# Patient Record
Sex: FEMALE | Race: BLACK OR AFRICAN AMERICAN | NOT HISPANIC OR LATINO | URBAN - METROPOLITAN AREA
[De-identification: names, ages, dates, MRNs, and addresses within clinical notes are randomized per-mention and may not be internally consistent; named-entity substitution may affect disease eponyms.]

---

## 2018-11-12 ENCOUNTER — OFFICE VISIT (OUTPATIENT)
Dept: FAMILY MEDICINE | Facility: CLINIC | Age: 33
End: 2018-11-12
Payer: COMMERCIAL

## 2018-11-12 VITALS
HEIGHT: 66 IN | TEMPERATURE: 98.4 F | WEIGHT: 123 LBS | OXYGEN SATURATION: 98 % | HEART RATE: 67 BPM | BODY MASS INDEX: 19.77 KG/M2

## 2018-11-12 DIAGNOSIS — A08.4 VIRAL ENTERITIS: Primary | ICD-10-CM

## 2018-11-12 PROCEDURE — 99213 OFFICE O/P EST LOW 20 MIN: CPT | Performed by: FAMILY MEDICINE

## 2018-11-12 RX ORDER — DROSPIRENONE AND ETHINYL ESTRADIOL 0.03MG-3MG
1 KIT ORAL
Refills: 1 | COMMUNITY
Start: 2018-10-22

## 2018-11-12 ASSESSMENT — ENCOUNTER SYMPTOMS
FATIGUE: 1
VOMITING: 0
SINUS PAIN: 0
CONSTIPATION: 0
SORE THROAT: 0
WHEEZING: 0
DIARRHEA: 1
CHILLS: 0
NAUSEA: 0
RHINORRHEA: 0
SHORTNESS OF BREATH: 0
ABDOMINAL PAIN: 0
SINUS PRESSURE: 0
PALPITATIONS: 0
FEVER: 0

## 2018-11-12 NOTE — LETTER
November 12, 2018     Patient: Robert Palacio   YOB: 1985   Date of Visit: 11/12/2018       To Whom it May Concern:    Robert Palacio was seen in my clinic on 11/12/2018 at 11:00 am. Please excuse Robert for her absence from work on this day to make the appointment.    If you have any questions or concerns, please don't hesitate to call.         Sincerely,         Amanda Booker, DO        CC: No Recipients

## 2018-11-12 NOTE — PATIENT INSTRUCTIONS
Over the counter debrox for your left ear to help soften the wax    If bowel movements become more frequent >5-6 a day, can take over the counter imodium

## 2018-11-12 NOTE — PROGRESS NOTES
"Subjective      Patient ID: Kyrsjeanne Palacio is a 33 y.o. female.    HPI     Cold sx x 6 days  Feels achy  Left ear ache  +diarrhea - going 3 times a day - just loose. No cramping no pain. Hasn't eaten as much lately either  Didn't work last week because isn't able to get to the bathroom at work unless the kids are on a break so didn't want to run the risk of not being able to go when needed   Feels warm but denies actual fever/chills  No sore throat no runny nose  No sinus pain or pressure    Got flu shot already this year      The following have been reviewed and updated as appropriate in this visit:  Allergies  Meds  Problems       Review of Systems   Constitutional: Positive for fatigue. Negative for chills and fever.   HENT: Negative for congestion, rhinorrhea, sinus pain, sinus pressure, sore throat and tinnitus.         Ear feels muffled (left)   Respiratory: Negative for shortness of breath and wheezing.    Cardiovascular: Negative for chest pain and palpitations.   Gastrointestinal: Positive for diarrhea. Negative for abdominal pain, constipation, nausea and vomiting.   Skin: Negative for rash.       Social History     Social History   • Marital status: Single     Spouse name: N/A   • Number of children: N/A   • Years of education: N/A     Occupational History   • Not on file.     Social History Main Topics   • Smoking status: Not on file   • Smokeless tobacco: Not on file   • Alcohol use Not on file   • Drug use: Unknown   • Sexual activity: Not on file     Other Topics Concern   • Not on file     Social History Narrative   • No narrative on file       Objective     Vitals:    11/12/18 1112   Pulse: 67   Temp: 36.9 °C (98.4 °F)   TempSrc: Oral   SpO2: 98%   Weight: 55.8 kg (123 lb)   Height: 1.67 m (5' 5.75\")     Body mass index is 20.01 kg/m².    Physical Exam   Constitutional: She is oriented to person, place, and time. She appears well-developed and well-nourished.   HENT:   Head: Normocephalic and " atraumatic.   Right Ear: External ear normal.   Nose: Nose normal.   Mouth/Throat: Oropharynx is clear and moist. No oropharyngeal exudate.   Left ear with cerumen impaction   Cardiovascular: Normal rate, regular rhythm and normal heart sounds.  Exam reveals no gallop and no friction rub.    No murmur heard.  Pulmonary/Chest: Effort normal and breath sounds normal. No respiratory distress. She has no wheezes. She has no rales.   Abdominal: Soft. Bowel sounds are normal. She exhibits no distension and no mass. There is no tenderness. There is no rebound and no guarding.   Neurological: She is alert and oriented to person, place, and time.   Skin: Skin is warm and dry.   Psychiatric: She has a normal mood and affect. Her behavior is normal.       Assessment/Plan   Problem List Items Addressed This Visit     None      Visit Diagnoses     Viral enteritis    -  Primary    improving, increase fluids, otc imodium if sx worsen. okay to return for work

## 2019-06-18 ENCOUNTER — OFFICE VISIT (OUTPATIENT)
Dept: FAMILY MEDICINE | Facility: CLINIC | Age: 34
End: 2019-06-18
Payer: COMMERCIAL

## 2019-06-18 VITALS
BODY MASS INDEX: 21.99 KG/M2 | WEIGHT: 132 LBS | TEMPERATURE: 98.8 F | DIASTOLIC BLOOD PRESSURE: 62 MMHG | HEIGHT: 65 IN | OXYGEN SATURATION: 96 % | HEART RATE: 68 BPM | SYSTOLIC BLOOD PRESSURE: 104 MMHG

## 2019-06-18 DIAGNOSIS — Z00.00 ROUTINE MEDICAL EXAM: Primary | ICD-10-CM

## 2019-06-18 DIAGNOSIS — Z11.1 TUBERCULOSIS SCREENING: ICD-10-CM

## 2019-06-18 DIAGNOSIS — M65.4 TENOSYNOVITIS, DE QUERVAIN: ICD-10-CM

## 2019-06-18 PROCEDURE — 86580 TB INTRADERMAL TEST: CPT | Performed by: FAMILY MEDICINE

## 2019-06-18 PROCEDURE — 99395 PREV VISIT EST AGE 18-39: CPT | Mod: 25 | Performed by: FAMILY MEDICINE

## 2019-06-18 ASSESSMENT — ENCOUNTER SYMPTOMS
TROUBLE SWALLOWING: 0
ADENOPATHY: 0
ARTHRALGIAS: 0
NERVOUS/ANXIOUS: 0
NAUSEA: 0
COLOR CHANGE: 0
CHILLS: 0
CHEST TIGHTNESS: 0
JOINT SWELLING: 0
VOMITING: 0
SHORTNESS OF BREATH: 0
HEADACHES: 0
COUGH: 0
BLOOD IN STOOL: 0
SORE THROAT: 0
DIFFICULTY URINATING: 0
CONSTIPATION: 0
PALPITATIONS: 0
ABDOMINAL PAIN: 0
HEMATURIA: 0
WHEEZING: 0
DIARRHEA: 0
DIZZINESS: 0
UNEXPECTED WEIGHT CHANGE: 0
EYE REDNESS: 0
FEVER: 0

## 2019-06-18 NOTE — PROGRESS NOTES
[facility]     CHIEF COMPLAINT   Robert Palacio is a 34 y.o. female who presents today for routine annual physical.   HISTORY OF PRESENT ILLNESS      Right thumb discomfort for the past few weeks. Denies injury. Aggravated with adduction.        Labs  No results found for: CREATININE  No results found for: WBC, HGB, HCT, MCV, PLT      No results found for: HGBA1C  No results found for: MICROALBUR, [id number]    PAST MEDICAL AND SURGICAL HISTORY        History reviewed. No pertinent past medical history.    Past Surgical History:   Procedure Laterality Date   • NO PAST SURGERIES         MEDICATIONS        Current Outpatient Prescriptions:   •  drospirenone-ethinyl estradiol (MAUDE,OCELLA) 3-0.03 mg per tablet, Take 1 tablet by mouth once daily., Disp: , Rfl: 1  •  FLUCELVAX QUAD 7826-6031, PF, 60 mcg (15 mcg x 4)/0.5 mL syringe injection, TO BE ADMINISTERED BY PHARMACIST FOR IMMUNIZATION, Disp: , Rfl: 0    ALLERGIES      Penicillins    FAMILY HISTORY      Family History   Problem Relation Age of Onset   • Adrenal disorder Father    • Diabetes Father    • Hypertension Father    • Diabetes Mother    • Hypertension Mother        SOCIAL HISTORY      Social History     Social History   • Marital status: Single     Spouse name: N/A   • Number of children: N/A   • Years of education: N/A     Occupational History   • Teacher       Social History Main Topics   • Smoking status: Never Smoker   • Smokeless tobacco: Never Used   • Alcohol use Yes      Comment: social    • Drug use: No   • Sexual activity: Yes     Partners: Male     Birth control/ protection: None     Other Topics Concern   • None     Social History Narrative   • None       REVIEW OF SYSTEMS      .Review of Systems   Constitutional: Negative for chills, fever and unexpected weight change.   HENT: Negative for congestion, ear pain, sore throat and trouble swallowing.    Eyes: Negative for redness and visual disturbance.   Respiratory:  "Negative for cough, chest tightness, shortness of breath and wheezing.    Cardiovascular: Negative for chest pain, palpitations and leg swelling.   Gastrointestinal: Negative for abdominal pain, blood in stool, constipation, diarrhea, nausea and vomiting.   Genitourinary: Negative for difficulty urinating and hematuria.   Musculoskeletal: Negative for arthralgias and joint swelling.   Skin: Negative for color change and rash.   Neurological: Negative for dizziness and headaches.   Hematological: Negative for adenopathy.   Psychiatric/Behavioral: The patient is not nervous/anxious.        PHYSICAL EXAMINATION      Vitals:    06/18/19 1233   BP: 104/62   Pulse: 68   Temp: 37.1 °C (98.8 °F)   SpO2: 96%   Weight: 59.9 kg (132 lb)   Height: 1.651 m (5' 5\")     Body mass index is 21.97 kg/m².    Physical Exam   Constitutional: She is oriented to person, place, and time. She appears well-developed and well-nourished.   HENT:   Head: Normocephalic.   Right Ear: Tympanic membrane, external ear and ear canal normal.   Left Ear: Tympanic membrane, external ear and ear canal normal.   Nose: Nose normal.   Mouth/Throat: Oropharynx is clear and moist. No oropharyngeal exudate.   Eyes: Pupils are equal, round, and reactive to light. Conjunctivae are normal.   Neck: Normal range of motion. Neck supple. No thyromegaly present.   Cardiovascular: Normal rate, regular rhythm and normal heart sounds.    Pulmonary/Chest: Effort normal and breath sounds normal.   Abdominal: Soft. She exhibits no mass. There is no tenderness.   Musculoskeletal: Normal range of motion. She exhibits no edema, tenderness or deformity.   DeQuervain's tendosynovitis: positive Finkelstein's test on R   Lymphadenopathy:     She has no cervical adenopathy.   Neurological: She is alert and oriented to person, place, and time.   Skin: Skin is warm and dry. No rash noted.   Psychiatric: She has a normal mood and affect. Her behavior is normal.          ASSESSMENT AND " PLAN   Assessment/Plan   Problem List Items Addressed This Visit     None      Visit Diagnoses     Routine medical exam    -  Primary    Relevant Orders    CBC and Differential    Comprehensive metabolic panel    Lipid panel    Tuberculosis screening        Relevant Orders    TB Skin Test    Tenosynovitis, de Quervain        ice, rest, thumb spica splint; RTO prn           Health maintenance discussed included:    Importance of engaging in regular physical activity. Gradually increasing activity to achieve the universal goal of 150 minutes of aerobic activity per week and 2 sessions of resistance exercise weekly would be ideal.     Importance of consuming a healthy diet was emphasized. Adoption of high quality, low fat, low carb foods was emphasized as was the importance of portion control.     Substance Abuse: refraining from tobacco usage, not exceeding moderate ETOH recommendations (2 drinks daily for men and 1 drink for women with 1 drink equal to 12 oz 4% beer, 6 oz wine or 1.5 oz spirits) and avoid the use of illicit drugs.    Importance of completing recommended health maintenance measures listed in the After Visit Summary.    Completing regular dental examinations and brushing and flossing daily.    Enhance safety by wearing seat belts, checking smoke and CO detectors and monitoring living space for potential fall risks.     Practicing safe sex where applicable.      Antony Rocha,   06/18/19  1:08 PM

## 2019-06-18 NOTE — PATIENT INSTRUCTIONS
Patient Education   De Quervain Tenosynovitis  Tendons attach muscles to bones. They also help with joint movements. When tendons become irritated or swollen, it is called tendinitis.  The extensor pollicis brevis (EPB) tendon connects the EPB muscle to a bone that is near the base of the thumb. The EPB muscle helps to straighten and extend the thumb. De Quervain tenosynovitis is a condition in which the EPB tendon lining (sheath) becomes irritated, thickened, and swollen. This condition is sometimes called stenosing tenosynovitis. This condition causes pain on the thumb side of the back of the wrist.  What are the causes?  Causes of this condition include:  · Activities that repeatedly cause your thumb and wrist to extend.  · A sudden increase in activity or change in activity that affects your wrist.  What increases the risk?  This condition is more likely to develop in:  · Females.  · People who have diabetes.  · Women who have recently given birth.  · People who are over 40 years of age.  · People who do activities that involve repeated hand and wrist motions, such as tennis, racquetball, volleyball, gardening, and taking care of children.  · People who do heavy labor.  · People who have poor wrist strength and flexibility.  · People who do not warm up properly before activities.  What are the signs or symptoms?  Symptoms of this condition include:  · Pain or tenderness over the thumb side of the back of the wrist when your thumb and wrist are not moving.  · Pain that gets worse when you straighten your thumb or extend your thumb or wrist.  · Pain when the injured area is touched.  · Locking or catching of the thumb joint while you bend and straighten your thumb.  · Decreased thumb motion due to pain.  · Swelling over the affected area.  How is this diagnosed?  This condition is diagnosed with a medical history and physical exam. Your health care provider will ask for details about your injury and ask about your  symptoms.  How is this treated?  Treatment may include the use of icing and medicines to reduce pain and swelling. You may also be advised to wear a splint or brace to limit your thumb and wrist motion. In less severe cases, treatment may also include working with a physical therapist to strengthen your wrist and calm the irritation around your EPB tendon sheath. In severe cases, surgery may be needed.  Follow these instructions at home:  If you have a splint or brace:  · Wear it as told by your health care provider. Remove it only as told by your health care provider.  · Loosen the splint or brace if your fingers become numb and tingle, or if they turn cold and blue.  · Keep the splint or brace clean and dry.  Managing pain, stiffness, and swelling  · If directed, apply ice to the injured area.  ¨ Put ice in a plastic bag.  ¨ Place a towel between your skin and the bag.  ¨ Leave the ice on for 20 minutes, 2-3 times per day.  · Move your fingers often to avoid stiffness and to lessen swelling.  · Raise (elevate) the injured area above the level of your heart while you are sitting or lying down.  General instructions  · Return to your normal activities as told by your health care provider. Ask your health care provider what activities are safe for you.  · Take over-the-counter and prescription medicines only as told by your health care provider.  · Keep all follow-up visits as told by your health care provider. This is important.  · Do not drive or operate heavy machinery while taking prescription pain medicine.  Contact a health care provider if:  · Your pain, tenderness, or swelling gets worse, even if you have had treatment.  · You have numbness or tingling in your wrist, hand, or fingers on the injured side.  This information is not intended to replace advice given to you by your health care provider. Make sure you discuss any questions you have with your health care provider.  Document Released: 12/18/2006  Document Revised: 05/25/2017 Document Reviewed: 02/23/2016  [employer] Interactive Patient Education © 2018 [employer]       Thumb spica splint

## 2019-07-02 ENCOUNTER — TELEPHONE (OUTPATIENT)
Dept: FAMILY MEDICINE | Facility: CLINIC | Age: 34
End: 2019-07-02

## 2019-07-02 NOTE — TELEPHONE ENCOUNTER
Attempted to contact patient in regards to PE form. Patient stated she did not need to return to office for her PPD reading. Returned patients call for clarification

## 2019-07-24 ENCOUNTER — OFFICE VISIT (OUTPATIENT)
Dept: FAMILY MEDICINE | Facility: CLINIC | Age: 34
End: 2019-07-24
Payer: COMMERCIAL

## 2019-07-24 DIAGNOSIS — Z11.1 ENCOUNTER FOR PPD TEST: Primary | ICD-10-CM

## 2019-07-24 PROCEDURE — 86580 TB INTRADERMAL TEST: CPT | Performed by: FAMILY MEDICINE

## 2019-07-24 PROCEDURE — 99999 PR OFFICE/OUTPT VISIT,PROCEDURE ONLY: CPT | Performed by: FAMILY MEDICINE

## 2019-07-26 LAB
INDURATION: 0 MM
TB SKIN TEST: NEGATIVE

## 2019-11-12 ENCOUNTER — OFFICE VISIT (OUTPATIENT)
Dept: FAMILY MEDICINE | Facility: CLINIC | Age: 34
End: 2019-11-12
Payer: COMMERCIAL

## 2019-11-12 ENCOUNTER — DOCUMENTATION (OUTPATIENT)
Dept: FAMILY MEDICINE | Facility: CLINIC | Age: 34
End: 2019-11-12

## 2019-11-12 VITALS
SYSTOLIC BLOOD PRESSURE: 104 MMHG | HEIGHT: 65 IN | DIASTOLIC BLOOD PRESSURE: 62 MMHG | WEIGHT: 137 LBS | OXYGEN SATURATION: 95 % | HEART RATE: 84 BPM | BODY MASS INDEX: 22.82 KG/M2 | TEMPERATURE: 98.3 F

## 2019-11-12 DIAGNOSIS — K52.9 GASTROENTERITIS: Primary | ICD-10-CM

## 2019-11-12 PROCEDURE — 99214 OFFICE O/P EST MOD 30 MIN: CPT | Performed by: FAMILY MEDICINE

## 2019-11-12 RX ORDER — LOPERAMIDE HYDROCHLORIDE 2 MG/1
2 CAPSULE ORAL 4 TIMES DAILY PRN
Qty: 30 CAPSULE | Refills: 0 | Status: SHIPPED | OUTPATIENT
Start: 2019-11-12 | End: 2022-02-28 | Stop reason: ALTCHOICE

## 2019-11-12 ASSESSMENT — ENCOUNTER SYMPTOMS
BLOOD IN STOOL: 0
EYE REDNESS: 0
ABDOMINAL PAIN: 0
NERVOUS/ANXIOUS: 0
FEVER: 0
DIARRHEA: 1
JOINT SWELLING: 0
COLOR CHANGE: 0
COUGH: 0
WHEEZING: 0
SORE THROAT: 0
HEMATURIA: 0
CONSTIPATION: 0
SHORTNESS OF BREATH: 0
TROUBLE SWALLOWING: 0
APPETITE CHANGE: 1
DIFFICULTY URINATING: 0
DIZZINESS: 0
CHILLS: 0
ARTHRALGIAS: 0
CHEST TIGHTNESS: 0
HEADACHES: 0
PALPITATIONS: 0
NAUSEA: 0
UNEXPECTED WEIGHT CHANGE: 0
ADENOPATHY: 0
VOMITING: 0

## 2019-11-12 NOTE — LETTER
November 12, 2019     Patient: Robert Palacio  YOB: 1985  Date of Visit: 11/12/2019    To Whom it May Concern:    Robert Palacio was seen in my clinic on 11/12/2019 at 2:15 pm. Please excuse Robert for her absence from work today to be seen in the office. Anticipated return to full duty on 11/13/19.    If you have any questions or concerns, please don't hesitate to call.         Sincerely,         Antony Rocha,         CC: No Recipients

## 2019-11-12 NOTE — PROGRESS NOTES
[facility]     HISTORY OF PRESENT ILLNESS        Patient presents to the office for evaluation of diarrhea that has been present for the past 6 days.  Patient states that she is moving her bowels loosely approximately 5-6 times per day.  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  Her appetite has been decreased but she is tolerating liquids.  Patient denies any nausea or vomiting, abdominal pain, rectal bleeding, fever or chills.  She denies any ingestion of raw foods and has no travel history recently.      PAST MEDICAL AND SURGICAL HISTORY        No past medical history on file.    Past Surgical History:   Procedure Laterality Date   • NO PAST SURGERIES         MEDICATIONS        Current Outpatient Medications:   •  drospirenone-ethinyl estradiol (MAUDE,OCELLA) 3-0.03 mg per tablet, Take 1 tablet by mouth once daily., Disp: , Rfl: 1  •  FLUCELVAX QUAD [id number], PF, 60 mcg (15 mcg x 4)/0.5 mL syringe injection, TO BE ADMINISTERED BY PHARMACIST FOR IMMUNIZATION, Disp: , Rfl: 0  •  loperamide (IMODIUM) 2 mg capsule, Take 1 capsule (2 mg total) by mouth 4 (four) times a day as needed for diarrhea for up to 10 days., Disp: 30 capsule, Rfl: 0    ALLERGIES      Penicillins    FAMILY HISTORY      Family History   Problem Relation Age of Onset   • Adrenal disorder Biological Father    • Diabetes Biological Father    • Hypertension Biological Father    • Diabetes Biological Mother    • Hypertension Biological Mother        SOCIAL HISTORY      Social History     Socioeconomic History   • Marital status: Single     Spouse name: Not on file   • Number of children: Not on file   • Years of education: Not on file   • Highest education level: Not on file   Occupational History   • Occupation: Teacher    Social Needs   • Financial resource strain: Not on file   • Food insecurity:     Worry: Not on file     Inability: Not on file   • Transportation needs:     Medical: Not on  file     Non-medical: Not on file   Tobacco Use   • Smoking status: Never Smoker   • Smokeless tobacco: Never Used   Substance and Sexual Activity   • Alcohol use: Yes     Comment: social    • Drug use: No   • Sexual activity: Yes     Partners: Male     Birth control/protection: None   Lifestyle   • Physical activity:     Days per week: Not on file     Minutes per session: Not on file   • Stress: Not on file   Relationships   • Social connections:     Talks on phone: Not on file     Gets together: Not on file     Attends [employer] service: Not on file     Active member of club or organization: Not on file     Attends meetings of clubs or organizations: Not on file     Relationship status: Not on file   • Intimate partner violence:     Fear of current or ex partner: Not on file     Emotionally abused: Not on file     Physically abused: Not on file     Forced sexual activity: Not on file   Other Topics Concern   • Not on file   Social History Narrative   • Not on file       REVIEW OF SYSTEMS      .Review of Systems   Constitutional: Positive for appetite change. Negative for chills, fever and unexpected weight change.   HENT: Negative for congestion, ear pain, sore throat and trouble swallowing.    Eyes: Negative for redness and visual disturbance.   Respiratory: Negative for cough, chest tightness, shortness of breath and wheezing.    Cardiovascular: Negative for chest pain, palpitations and leg swelling.   Gastrointestinal: Positive for diarrhea. Negative for abdominal pain, blood in stool, constipation, nausea and vomiting.   Genitourinary: Negative for difficulty urinating and hematuria.   Musculoskeletal: Negative for arthralgias and joint swelling.   Skin: Negative for color change and rash.   Neurological: Negative for dizziness and headaches.   Hematological: Negative for adenopathy.   Psychiatric/Behavioral: The patient is not nervous/anxious.        PHYSICAL EXAMINATION      Vitals:    11/12/19 1427   BP:  "104/62   Pulse: 84   Temp: 36.8 °C (98.3 °F)   SpO2: 95%   Weight: 62.1 kg (137 lb)   Height: 1.651 m (5' 5\")     Body mass index is 22.8 kg/m².    Physical Exam   Constitutional: She is oriented to person, place, and time. She appears well-developed and well-nourished.   Cardiovascular: Normal rate, regular rhythm, normal heart sounds and intact distal pulses.   Pulmonary/Chest: Effort normal and breath sounds normal.   Abdominal: Soft. Bowel sounds are normal. She exhibits no distension and no mass. There is no tenderness. There is no rebound and no guarding.   Neurological: She is alert and oriented to person, place, and time.   Psychiatric: She has a normal mood and affect. Her behavior is normal.          ASSESSMENT AND PLAN   Assessment/Plan   Problem List Items Addressed This Visit     None      Visit Diagnoses     Gastroenteritis    -  Primary    Likely viral etiology; recommend brat diet and clear liquids; Rx for Imodium as needed.            Antony Rocha, DO  11/12/19  3:43 PM        "

## 2019-11-12 NOTE — LETTER
November 12, 2019    Patient: Robert Palacio  YOB: 1985  Date of Visit: 11/12/2019    To Whom it May Concern:    Robert Palacio was seen in my clinic on 11/12/2019 at 2:15 pm. Please excuse Robert for her absence from work today to be seen in office. Anticipated return to full duty on 11/14/2019.    If you have any questions or concerns, please don't hesitate to call.         Sincerely,         Antony Rocha, DO

## 2019-11-13 ENCOUNTER — TELEPHONE (OUTPATIENT)
Dept: FAMILY MEDICINE | Facility: CLINIC | Age: 34
End: 2019-11-13

## 2019-11-13 NOTE — TELEPHONE ENCOUNTER
Attempted to call patient and let her know she can  the requested work note. Patient voice mailbox full unable to leave message

## 2019-11-13 NOTE — TELEPHONE ENCOUNTER
Patient called requesting her out of work note be extended until tomorrow 11/14/19.   Okay to write ?     Thanks!

## 2020-10-28 ENCOUNTER — TELEPHONE (OUTPATIENT)
Dept: FAMILY MEDICINE | Facility: CLINIC | Age: 35
End: 2020-10-28

## 2020-12-07 ENCOUNTER — OFFICE VISIT (OUTPATIENT)
Dept: FAMILY MEDICINE | Facility: CLINIC | Age: 35
End: 2020-12-07
Payer: COMMERCIAL

## 2020-12-07 VITALS
WEIGHT: 156 LBS | HEART RATE: 84 BPM | TEMPERATURE: 97.4 F | SYSTOLIC BLOOD PRESSURE: 108 MMHG | OXYGEN SATURATION: 97 % | DIASTOLIC BLOOD PRESSURE: 66 MMHG | BODY MASS INDEX: 25.96 KG/M2

## 2020-12-07 DIAGNOSIS — Z00.00 ROUTINE MEDICAL EXAM: Primary | ICD-10-CM

## 2020-12-07 PROCEDURE — 99395 PREV VISIT EST AGE 18-39: CPT | Performed by: FAMILY MEDICINE

## 2020-12-07 ASSESSMENT — ENCOUNTER SYMPTOMS
ABDOMINAL PAIN: 0
CHILLS: 0
TROUBLE SWALLOWING: 0
DIARRHEA: 0
WHEEZING: 0
HEMATURIA: 0
SORE THROAT: 0
EYE REDNESS: 0
CHEST TIGHTNESS: 0
FEVER: 0
DIZZINESS: 0
UNEXPECTED WEIGHT CHANGE: 0
ADENOPATHY: 0
JOINT SWELLING: 0
HEADACHES: 0
NAUSEA: 0
VOMITING: 0
CONSTIPATION: 0
ARTHRALGIAS: 0
DIFFICULTY URINATING: 0
COUGH: 0
BLOOD IN STOOL: 0
COLOR CHANGE: 0
NERVOUS/ANXIOUS: 0
SHORTNESS OF BREATH: 0
PALPITATIONS: 0

## 2020-12-07 NOTE — PROGRESS NOTES
[facility]     CHIEF COMPLAINT   Robert Palacio is a 35 y.o. female who presents today for routine annual physical.   HISTORY OF PRESENT ILLNESS      HPI    Labs  No results found for: CREATININE  No results found for: WBC, HGB, HCT, MCV, PLT      No results found for: HGBA1C  No results found for: MICROALBUR, [id number]    PAST MEDICAL AND SURGICAL HISTORY        History reviewed. No pertinent past medical history.    Past Surgical History:   Procedure Laterality Date   • NO PAST SURGERIES         MEDICATIONS        Current Outpatient Medications:   •  drospirenone-ethinyl estradiol (MAUDE,OCELLA) 3-0.03 mg per tablet, Take 1 tablet by mouth once daily., Disp: , Rfl: 1  •  loperamide (IMODIUM) 2 mg capsule, Take 1 capsule (2 mg total) by mouth 4 (four) times a day as needed for diarrhea for up to 10 days., Disp: 30 capsule, Rfl: 0    ALLERGIES      Penicillins    FAMILY HISTORY      Family History   Problem Relation Age of Onset   • Adrenal disorder Biological Father    • Diabetes Biological Father    • Hypertension Biological Father    • Diabetes Biological Mother    • Hypertension Biological Mother        SOCIAL HISTORY      Social History     Socioeconomic History   • Marital status: Single     Spouse name: None   • Number of children: None   • Years of education: None   • Highest education level: None   Occupational History   • Occupation: Teacher    Social Needs   • Financial resource strain: None   • Food insecurity     Worry: None     Inability: None   • Transportation needs     Medical: None     Non-medical: None   Tobacco Use   • Smoking status: Never Smoker   • Smokeless tobacco: Never Used   Substance and Sexual Activity   • Alcohol use: Yes     Comment: social    • Drug use: No   • Sexual activity: Yes     Partners: Male     Birth control/protection: None   Lifestyle   • Physical activity     Days per week: None     Minutes per session: None   • Stress: None   Relationships   •  Social connections     Talks on phone: None     Gets together: None     Attends [employer] service: None     Active member of club or organization: None     Attends meetings of clubs or organizations: None     Relationship status: None   • Intimate partner violence     Fear of current or ex partner: None     Emotionally abused: None     Physically abused: None     Forced sexual activity: None   Other Topics Concern   • None   Social History Narrative   • None       REVIEW OF SYSTEMS      .Review of Systems   Constitutional: Negative for chills, fever and unexpected weight change.   HENT: Negative for congestion, ear pain, sore throat and trouble swallowing.    Eyes: Negative for redness and visual disturbance.   Respiratory: Negative for cough, chest tightness, shortness of breath and wheezing.    Cardiovascular: Negative for chest pain, palpitations and leg swelling.   Gastrointestinal: Negative for abdominal pain, blood in stool, constipation, diarrhea, nausea and vomiting.   Genitourinary: Negative for difficulty urinating and hematuria.   Musculoskeletal: Negative for arthralgias and joint swelling.   Skin: Negative for color change and rash.   Neurological: Negative for dizziness and headaches.   Hematological: Negative for adenopathy.   Psychiatric/Behavioral: The patient is not nervous/anxious.        PHYSICAL EXAMINATION      Vitals:    12/07/20 1303   BP: 108/66   Pulse: 84   Temp: 36.3 °C (97.4 °F)   SpO2: 97%   Weight: 70.8 kg (156 lb)     Body mass index is 25.96 kg/m².    Physical Exam  Constitutional:       Appearance: She is well-developed.   HENT:      Head: Normocephalic.      Right Ear: External ear normal.      Left Ear: External ear normal.      Nose: Nose normal.   Eyes:      Conjunctiva/sclera: Conjunctivae normal.      Pupils: Pupils are equal, round, and reactive to light.   Neck:      Musculoskeletal: Normal range of motion.   Cardiovascular:      Rate and Rhythm: Normal rate and regular  "rhythm.      Heart sounds: Normal heart sounds.   Pulmonary:      Effort: Pulmonary effort is normal.      Breath sounds: Normal breath sounds.   Skin:     General: Skin is warm and dry.   Neurological:      Mental Status: She is alert and oriented to person, place, and time.   Psychiatric:         Behavior: Behavior normal.            ASSESSMENT AND PLAN   Assessment/Plan   Problem List Items Addressed This Visit        Other    Routine medical exam - Primary     Wellness Recommendations:    Exercise: engaging in regular physical activity is extremely important to preserve your functional capacity throughout your lifetime. Successful exercise is dependent upon 3 elements. Think \"DIF\". \"D\" = duration of exercise - 20 to 30 minutes per session. \"I\" = intensity - moderate intensity is preferred. You shouldn't be out of breath, nor should you be able to speak normally. Slightly winded is how I would put it. \"F\" = frequency - at least 3-5 times per week. Once a week won't cut it. Start our slow and gradually increase your activity to achieve the universal goal of 150 minutes of aerobic activity per week and 2 sessions of resistance exercise weekly would be ideal.     Eating: Importance of consuming a healthy diet should be emphasized in your health plan. Adoption of high quality, low fat, low carb foods was emphasized as was the importance of portion control. Take a look at the Mediterranean Diet - proven year after year as the healthiest diet.    Substance Abuse: refrain from tobacco usage and vaping products as these can result in significant injury. If you choose to consume alcohol keep consumption within moderation. This is defined as recommendations 1 alcoholic drink per day. One drink is equal to 12 oz 4% beer, 6 oz wine or 1.5 oz spirits). The use of illicit drugs should never be considered. Use extreme caution when being prescribed opioid containing medications as these can easily become addictive and lead to " illicit drug use.    Complete all of the recommended health maintenance measures we discussed during today's visit.     Schedule regular dental examinations and don't forget to brush and floss your teeth daily.    Enhance your safety by wearing seat belts, checking your smoke and CO detectors and monitoring living space for potential fall risks.     Practice safe sex where applicable.         Relevant Orders    CBC and Differential    Comprehensive metabolic panel    Lipid panel    THYROID CASCADE PROFILE           Scheduled for gyn tomorrow.      Antony Rocha,   12/07/20  1:27 PM

## 2020-12-07 NOTE — ASSESSMENT & PLAN NOTE
"Wellness Recommendations:    Exercise: engaging in regular physical activity is extremely important to preserve your functional capacity throughout your lifetime. Successful exercise is dependent upon 3 elements. Think \"DIF\". \"D\" = duration of exercise - 20 to 30 minutes per session. \"I\" = intensity - moderate intensity is preferred. You shouldn't be out of breath, nor should you be able to speak normally. Slightly winded is how I would put it. \"F\" = frequency - at least 3-5 times per week. Once a week won't cut it. Start our slow and gradually increase your activity to achieve the universal goal of 150 minutes of aerobic activity per week and 2 sessions of resistance exercise weekly would be ideal.     Eating: Importance of consuming a healthy diet should be emphasized in your health plan. Adoption of high quality, low fat, low carb foods was emphasized as was the importance of portion control. Take a look at the Mediterranean Diet - proven year after year as the healthiest diet.    Substance Abuse: refrain from tobacco usage and vaping products as these can result in significant injury. If you choose to consume alcohol keep consumption within moderation. This is defined as recommendations 1 alcoholic drink per day. One drink is equal to 12 oz 4% beer, 6 oz wine or 1.5 oz spirits). The use of illicit drugs should never be considered. Use extreme caution when being prescribed opioid containing medications as these can easily become addictive and lead to illicit drug use.    Complete all of the recommended health maintenance measures we discussed during today's visit.     Schedule regular dental examinations and don't forget to brush and floss your teeth daily.    Enhance your safety by wearing seat belts, checking your smoke and CO detectors and monitoring living space for potential fall risks.     Practice safe sex where applicable.  "

## 2021-02-10 VITALS — BODY MASS INDEX: 26.46 KG/M2 | HEIGHT: 64 IN | WEIGHT: 155 LBS

## 2021-02-10 NOTE — PROGRESS NOTES
Verification of Patient Location:  The patient affirms they are currently located in the following state: [place]    Request for Consent:    Video Encounter   Nahum, my name is Tiffanietony Lopez DO.  Before we proceed, can you please verify your identification by telling me your full name and date of birth?  Can you tell me who is in the room with you?    You and I are about to have a telemedicine check-in or visit because you have requested it.  This is a live video-conference.  I am a real person, speaking to you in real time.  There is no one else with me on the video-conference.  However, when we use ([employer], [employer], etc) it is important for you to know that the video-conference may not be secure or private.  I am not recording this conversation and I am asking you not to record it.  This telemedicine visit will be billed to your health insurance or you, if you are self-insured.  You understand you will be responsible for any copayments or coinsurances that apply to your telemedicine visit.  Communication platform used for this encounter:  Integrated Zoom via [employer] Video Visit     Before starting our telemedicine visit, I am required to get your consent for this virtual check-in or visit by telemedicine. Do you consent?      Patient Response to Request for Consent:  Yes      Visit Documentation:  Subjective     Patient ID: Robert Palacio is a 35 y.o. female.  1985      CC: Symptoms concerning for COVID infection    - Symptoms started on 2/1/2021, started with cough, post nasal drip, sinus congestion, loss of taste/smell  - Has been taking Allegra/ Dayquil/Nyquil / Mucinex.  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       The following have been reviewed and updated as appropriate in this visit:  Allergies  Meds  Problems       Review of Systems   Constitutional: Positive for fatigue. Negative for fever.   HENT: Positive for congestion and postnasal drip.    Respiratory: Positive for cough. Negative for chest tightness and shortness of breath.    Cardiovascular: Negative for chest pain.   Gastrointestinal: Negative for abdominal pain, diarrhea, nausea and vomiting.   Musculoskeletal: Positive for myalgias.         Assessment/Plan   Diagnoses and all orders for this visit:    COVID-19 virus infection (Primary)  Assessment & Plan:  Advised to continue to self isolate for 14 days- will provide work note to return on 2/15/2021. Continue self monitoring symptoms. If any worsening symptoms, such as shortness of breath, pt instructed to call office. Discussed with patient self treatment with fluids, rest, supportive treatment with Allegra/mucinex/tylenol and continue to complete course of doxycycline.     Pt voiced understanding and agreed with plan of care.         Time Spent:  I spent 23 minutes on this date of service performing the following activities: obtaining history, documenting and providing counseling and education.

## 2021-02-11 ENCOUNTER — TELEMEDICINE (OUTPATIENT)
Dept: FAMILY MEDICINE | Facility: CLINIC | Age: 36
End: 2021-02-11
Payer: COMMERCIAL

## 2021-02-11 DIAGNOSIS — U07.1 COVID-19 VIRUS INFECTION: Primary | ICD-10-CM

## 2021-02-11 PROCEDURE — 99213 OFFICE O/P EST LOW 20 MIN: CPT | Mod: GC,95 | Performed by: STUDENT IN AN ORGANIZED HEALTH CARE EDUCATION/TRAINING PROGRAM

## 2021-02-11 ASSESSMENT — ENCOUNTER SYMPTOMS
FATIGUE: 1
DIARRHEA: 0
COUGH: 1
ABDOMINAL PAIN: 0
SHORTNESS OF BREATH: 0
CHEST TIGHTNESS: 0
MYALGIAS: 1
NAUSEA: 0
FEVER: 0
VOMITING: 0

## 2021-02-11 NOTE — ASSESSMENT & PLAN NOTE
Advised to continue to self isolate for 14 days- will provide work note to return on 2/15/2021. Continue self monitoring symptoms. If any worsening symptoms, such as shortness of breath, pt instructed to call office. Discussed with patient self treatment with fluids, rest, supportive treatment with Allegra/mucinex/tylenol and continue to complete course of doxycycline.     Pt voiced understanding and agreed with plan of care.

## 2021-02-11 NOTE — LETTER
February 11, 2021     Patient: Robert Palacio  YOB: 1985  Date of Visit: 2/11/2021    To Whom it May Concern:    Robert Palacio was seen in my clinic on 2/11/2021 at 11:30 am. Please excuse Robert for her absence from work due to illness from COVID-19 virus. She may return to work on Monday, February 15th, 2021.     If you have any questions or concerns, please don't hesitate to call.         Sincerely,           Tiffanie Lopez,         CC: No Recipients

## 2021-02-11 NOTE — PATIENT INSTRUCTIONS
What does it mean to “self-isolate?”    You should stay home.   Do not go to work, classes, athletic events, or other social gatherings until cleared to do so by your healthcare provider and your local health department.   While self-isolating, follow these guidelines:  •Immediately report fever, cough, or shortness of breath to your local health departmentand healthcare provider.  •Limit contact with others as much as possible, including those living in the same space. Close contact is defined as closer than a 6 foot distance.This includes contact with pets and animals.  •Do not travel.  •Cover coughs or sneezes with your upper sleeve or a tissue.•Wash your hands with soap and water, or use alcohol-based hand rubs after coughing or sneezing or throwing a used tissue in the garbage.  •Avoid sharing household items. Do not share drinking glasses, towels, eating utensils, bedding, or any otheritems until you are no longer asked to self-isolate.  •Keep your surroundings clean. While the COVID-19 virus is not spread very well from contact with soiled household surfaces, make an effort to clean surfaces that you share(e.g door knobs, telephones, and bathroom surfaces)with a household disinfectant such as Clorox wipes. Wash your hands after cleaning these areas.  •If you need to seek medical attention while self-isolating, call ahead.  •If you have a medical emergency and need to call 911, notify thedispatch personnel that you are self-isolating for COVID-19.

## 2021-02-12 NOTE — PROGRESS NOTES
I participated in a telemedicine visit with the patient and discussed the management of all critical elements of the visit with the patient and resident. I reviewed the resident's note and agree with the documented findings and plan of care, except for my comments below or within the additional notes today.    The total number of minutes I personally spent on the telemedicine visit: 4

## 2021-07-12 ENCOUNTER — APPOINTMENT (RX ONLY)
Dept: URBAN - METROPOLITAN AREA CLINIC 28 | Facility: CLINIC | Age: 36
Setting detail: DERMATOLOGY
End: 2021-07-12

## 2021-07-12 DIAGNOSIS — L70.0 ACNE VULGARIS: ICD-10-CM

## 2021-07-12 PROCEDURE — ? COUNSELING

## 2021-07-12 PROCEDURE — ? PRESCRIPTION MEDICATION MANAGEMENT

## 2021-07-12 PROCEDURE — 99203 OFFICE O/P NEW LOW 30 MIN: CPT

## 2021-07-12 PROCEDURE — ? PRESCRIPTION

## 2021-07-12 RX ORDER — SODIUM SULFACETAMIDE AND SULFUR 80; 40 MG/ML; MG/ML
LOTION TOPICAL QDAY
Qty: 1 | Refills: 3 | Status: ERX | COMMUNITY
Start: 2021-07-12

## 2021-07-12 RX ORDER — DAPSONE 50 MG/G
GEL TOPICAL QHS
Qty: 1 | Refills: 3 | Status: ERX | COMMUNITY
Start: 2021-07-12

## 2021-07-12 RX ORDER — DOXYCYCLINE HYCLATE 150 MG/1
TABLET, FILM COATED ORAL QDAY
Qty: 30 | Refills: 3 | Status: ERX | COMMUNITY
Start: 2021-07-12

## 2021-07-12 RX ADMIN — DOXYCYCLINE HYCLATE: 150 TABLET, FILM COATED ORAL at 00:00

## 2021-07-12 RX ADMIN — SODIUM SULFACETAMIDE AND SULFUR: 80; 40 LOTION TOPICAL at 00:00

## 2021-07-12 RX ADMIN — DAPSONE: 50 GEL TOPICAL at 00:00

## 2021-07-12 ASSESSMENT — LOCATION ZONE DERM: LOCATION ZONE: FACE

## 2021-07-12 ASSESSMENT — LOCATION DETAILED DESCRIPTION DERM: LOCATION DETAILED: LEFT CENTRAL MALAR CHEEK

## 2021-07-12 ASSESSMENT — LOCATION SIMPLE DESCRIPTION DERM: LOCATION SIMPLE: LEFT CHEEK

## 2021-07-12 NOTE — HPI: PIMPLES (ACNE)
What Type Of Note Output Would You Prefer (Optional)?: Standard Output
How Severe Is Your Acne?: mild
Is This A New Presentation, Or A Follow-Up?: Acne
Additional Comments (Use Complete Sentences): She has been using Cetaphil Cleanser Neutrogena.

## 2021-07-12 NOTE — PROCEDURE: COUNSELING
Doxycycline Pregnancy And Lactation Text: This medication is Pregnancy Category D and not consider safe during pregnancy. It is also excreted in breast milk but is considered safe for shorter treatment courses.
Azithromycin Counseling:  I discussed with the patient the risks of azithromycin including but not limited to GI upset, allergic reaction, drug rash, diarrhea, and yeast infections.
Tetracycline Pregnancy And Lactation Text: This medication is Pregnancy Category D and not consider safe during pregnancy. It is also excreted in breast milk.
Tetracycline Counseling: Patient counseled regarding possible photosensitivity and increased risk for sunburn.  Patient instructed to avoid sunlight, if possible.  When exposed to sunlight, patients should wear protective clothing, sunglasses, and sunscreen.  The patient was instructed to call the office immediately if the following severe adverse effects occur:  hearing changes, easy bruising/bleeding, severe headache, or vision changes.  The patient verbalized understanding of the proper use and possible adverse effects of tetracycline.  All of the patient's questions and concerns were addressed. Patient understands to avoid pregnancy while on therapy due to potential birth defects.
Include Pregnancy/Lactation Warning?: No
Isotretinoin Pregnancy And Lactation Text: This medication is Pregnancy Category X and is considered extremely dangerous during pregnancy. It is unknown if it is excreted in breast milk.
Topical Retinoid Pregnancy And Lactation Text: This medication is Pregnancy Category C. It is unknown if this medication is excreted in breast milk.
Sarecycline Counseling: Patient advised regarding possible photosensitivity and discoloration of the teeth, skin, lips, tongue and gums.  Patient instructed to avoid sunlight, if possible.  When exposed to sunlight, patients should wear protective clothing, sunglasses, and sunscreen.  The patient was instructed to call the office immediately if the following severe adverse effects occur:  hearing changes, easy bruising/bleeding, severe headache, or vision changes.  The patient verbalized understanding of the proper use and possible adverse effects of sarecycline.  All of the patient's questions and concerns were addressed.
Topical Sulfur Applications Counseling: Topical Sulfur Counseling: Patient counseled that this medication may cause skin irritation or allergic reactions.  In the event of skin irritation, the patient was advised to reduce the amount of the drug applied or use it less frequently.   The patient verbalized understanding of the proper use and possible adverse effects of topical sulfur application.  All of the patient's questions and concerns were addressed.
Azithromycin Pregnancy And Lactation Text: This medication is considered safe during pregnancy and is also secreted in breast milk.
Tazorac Counseling:  Patient advised that medication is irritating and drying.  Patient may need to apply sparingly and wash off after an hour before eventually leaving it on overnight.  The patient verbalized understanding of the proper use and possible adverse effects of tazorac.  All of the patient's questions and concerns were addressed.
Benzoyl Peroxide Counseling: Patient counseled that medicine may cause skin irritation and bleach clothing.  In the event of skin irritation, the patient was advised to reduce the amount of the drug applied or use it less frequently.   The patient verbalized understanding of the proper use and possible adverse effects of benzoyl peroxide.  All of the patient's questions and concerns were addressed.
High Dose Vitamin A Pregnancy And Lactation Text: High dose vitamin A therapy is contraindicated during pregnancy and breast feeding.
High Dose Vitamin A Counseling: Side effects reviewed, pt to contact office should one occur.
Birth Control Pills Pregnancy And Lactation Text: This medication should be avoided if pregnant and for the first 30 days post-partum.
Topical Sulfur Applications Pregnancy And Lactation Text: This medication is Pregnancy Category C and has an unknown safety profile during pregnancy. It is unknown if this topical medication is excreted in breast milk.
Erythromycin Counseling:  I discussed with the patient the risks of erythromycin including but not limited to GI upset, allergic reaction, drug rash, diarrhea, increase in liver enzymes, and yeast infections.
Erythromycin Pregnancy And Lactation Text: This medication is Pregnancy Category B and is considered safe during pregnancy. It is also excreted in breast milk.
Tazorac Pregnancy And Lactation Text: This medication is not safe during pregnancy. It is unknown if this medication is excreted in breast milk.
Minocycline Counseling: Patient advised regarding possible photosensitivity and discoloration of the teeth, skin, lips, tongue and gums.  Patient instructed to avoid sunlight, if possible.  When exposed to sunlight, patients should wear protective clothing, sunglasses, and sunscreen.  The patient was instructed to call the office immediately if the following severe adverse effects occur:  hearing changes, easy bruising/bleeding, severe headache, or vision changes.  The patient verbalized understanding of the proper use and possible adverse effects of minocycline.  All of the patient's questions and concerns were addressed.
Dapsone Pregnancy And Lactation Text: This medication is Pregnancy Category C and is not considered safe during pregnancy or breast feeding.
Dapsone Counseling: I discussed with the patient the risks of dapsone including but not limited to hemolytic anemia, agranulocytosis, rashes, methemoglobinemia, kidney failure, peripheral neuropathy, headaches, GI upset, and liver toxicity.  Patients who start dapsone require monitoring including baseline LFTs and weekly CBCs for the first month, then every month thereafter.  The patient verbalized understanding of the proper use and possible adverse effects of dapsone.  All of the patient's questions and concerns were addressed.
Benzoyl Peroxide Pregnancy And Lactation Text: This medication is Pregnancy Category C. It is unknown if benzoyl peroxide is excreted in breast milk.
Bactrim Counseling:  I discussed with the patient the risks of sulfa antibiotics including but not limited to GI upset, allergic reaction, drug rash, diarrhea, dizziness, photosensitivity, and yeast infections.  Rarely, more serious reactions can occur including but not limited to aplastic anemia, agranulocytosis, methemoglobinemia, blood dyscrasias, liver or kidney failure, lung infiltrates or desquamative/blistering drug rashes.
Spironolactone Counseling: Patient advised regarding risks of diarrhea, abdominal pain, hyperkalemia, birth defects (for female patients), liver toxicity and renal toxicity. The patient may need blood work to monitor liver and kidney function and potassium levels while on therapy. The patient verbalized understanding of the proper use and possible adverse effects of spironolactone.  All of the patient's questions and concerns were addressed.
Detail Level: Zone
Doxycycline Counseling:  Patient counseled regarding possible photosensitivity and increased risk for sunburn.  Patient instructed to avoid sunlight, if possible.  When exposed to sunlight, patients should wear protective clothing, sunglasses, and sunscreen.  The patient was instructed to call the office immediately if the following severe adverse effects occur:  hearing changes, easy bruising/bleeding, severe headache, or vision changes.  The patient verbalized understanding of the proper use and possible adverse effects of doxycycline.  All of the patient's questions and concerns were addressed.
Topical Clindamycin Counseling: Patient counseled that this medication may cause skin irritation or allergic reactions.  In the event of skin irritation, the patient was advised to reduce the amount of the drug applied or use it less frequently.   The patient verbalized understanding of the proper use and possible adverse effects of clindamycin.  All of the patient's questions and concerns were addressed.
Birth Control Pills Counseling: Birth Control Pill Counseling: I discussed with the patient the potential side effects of OCPs including but not limited to increased risk of stroke, heart attack, thrombophlebitis, deep venous thrombosis, hepatic adenomas, breast changes, GI upset, headaches, and depression.  The patient verbalized understanding of the proper use and possible adverse effects of OCPs. All of the patient's questions and concerns were addressed.
Topical Retinoid counseling:  Patient advised to apply a pea-sized amount only at bedtime and wait 30 minutes after washing their face before applying.  If too drying, patient may add a non-comedogenic moisturizer. The patient verbalized understanding of the proper use and possible adverse effects of retinoids.  All of the patient's questions and concerns were addressed.
Bactrim Pregnancy And Lactation Text: This medication is Pregnancy Category D and is known to cause fetal risk.  It is also excreted in breast milk.
Spironolactone Pregnancy And Lactation Text: This medication can cause feminization of the male fetus and should be avoided during pregnancy. The active metabolite is also found in breast milk.
Topical Clindamycin Pregnancy And Lactation Text: This medication is Pregnancy Category B and is considered safe during pregnancy. It is unknown if it is excreted in breast milk.
Isotretinoin Counseling: Patient should get monthly blood tests, not donate blood, not drive at night if vision affected, not share medication, and not undergo elective surgery for 6 months after tx completed. Side effects reviewed, pt to contact office should one occur.

## 2021-07-12 NOTE — PROCEDURE: PRESCRIPTION MEDICATION MANAGEMENT
Initiate Treatment: SulfaCleanse 8-4 8 %-4 % topical suspension: Wash face qday; doxycycline hyclate 150 mg tablet: Take 1 tab po Qday; dapsone 5 % topical gel: Apply to face QHS
Detail Level: Zone
Render In Strict Bullet Format?: No

## 2021-07-27 ENCOUNTER — TELEPHONE (OUTPATIENT)
Dept: FAMILY MEDICINE | Facility: CLINIC | Age: 36
End: 2021-07-27

## 2021-07-30 LAB
ALBUMIN SERPL-MCNC: 4.4 G/DL (ref 3.8–4.8)
ALBUMIN/GLOB SERPL: 1.5 {RATIO} (ref 1.2–2.2)
ALP SERPL-CCNC: 60 IU/L (ref 48–121)
ALT SERPL-CCNC: 16 IU/L (ref 0–32)
AST SERPL-CCNC: 18 IU/L (ref 0–40)
BASOPHILS # BLD AUTO: 0.1 X10E3/UL (ref 0–0.2)
BASOPHILS NFR BLD AUTO: 1 %
BILIRUB SERPL-MCNC: 0.6 MG/DL (ref 0–1.2)
BUN SERPL-MCNC: 11 MG/DL (ref 6–20)
BUN/CREAT SERPL: 14 (ref 9–23)
CALCIUM SERPL-MCNC: 9.9 MG/DL (ref 8.7–10.2)
CHLORIDE SERPL-SCNC: 106 MMOL/L (ref 96–106)
CHOLEST SERPL-MCNC: 170 MG/DL (ref 100–199)
CO2 SERPL-SCNC: 24 MMOL/L (ref 20–29)
CREAT SERPL-MCNC: 0.8 MG/DL (ref 0.57–1)
EOSINOPHIL # BLD AUTO: 0.2 X10E3/UL (ref 0–0.4)
EOSINOPHIL NFR BLD AUTO: 2 %
ERYTHROCYTE [DISTWIDTH] IN BLOOD BY AUTOMATED COUNT: 12.1 % (ref 11.7–15.4)
GLOBULIN SER CALC-MCNC: 3 G/DL (ref 1.5–4.5)
GLUCOSE SERPL-MCNC: 83 MG/DL (ref 65–99)
HCT VFR BLD AUTO: 35.6 % (ref 34–46.6)
HDLC SERPL-MCNC: 68 MG/DL
HGB BLD-MCNC: 12.2 G/DL (ref 11.1–15.9)
IMM GRANULOCYTES # BLD AUTO: 0 X10E3/UL (ref 0–0.1)
IMM GRANULOCYTES NFR BLD AUTO: 0 %
LAB CORP EGFR IF AFRICN AM: 110 ML/MIN/1.73
LAB CORP EGFR IF NONAFRICN AM: 95 ML/MIN/1.73
LDLC SERPL CALC-MCNC: 93 MG/DL (ref 0–99)
LYMPHOCYTES # BLD AUTO: 2.9 X10E3/UL (ref 0.7–3.1)
LYMPHOCYTES NFR BLD AUTO: 30 %
MCH RBC QN AUTO: 30 PG (ref 26.6–33)
MCHC RBC AUTO-ENTMCNC: 34.3 G/DL (ref 31.5–35.7)
MCV RBC AUTO: 88 FL (ref 79–97)
MONOCYTES # BLD AUTO: 0.7 X10E3/UL (ref 0.1–0.9)
MONOCYTES NFR BLD AUTO: 7 %
NEUTROPHILS # BLD AUTO: 5.9 X10E3/UL (ref 1.4–7)
NEUTROPHILS NFR BLD AUTO: 60 %
PLATELET # BLD AUTO: 391 X10E3/UL (ref 150–450)
POTASSIUM SERPL-SCNC: 4.1 MMOL/L (ref 3.5–5.2)
PROT SERPL-MCNC: 7.4 G/DL (ref 6–8.5)
RBC # BLD AUTO: 4.07 X10E6/UL (ref 3.77–5.28)
SODIUM SERPL-SCNC: 142 MMOL/L (ref 134–144)
TRIGL SERPL-MCNC: 41 MG/DL (ref 0–149)
TSH SERPL DL<=0.005 MIU/L-ACNC: 1.56 UIU/ML (ref 0.45–4.5)
VLDLC SERPL CALC-MCNC: 9 MG/DL (ref 5–40)
WBC # BLD AUTO: 9.7 X10E3/UL (ref 3.4–10.8)

## 2021-09-15 ENCOUNTER — APPOINTMENT (RX ONLY)
Dept: URBAN - METROPOLITAN AREA CLINIC 28 | Facility: CLINIC | Age: 36
Setting detail: DERMATOLOGY
End: 2021-09-15

## 2021-09-15 DIAGNOSIS — L70.0 ACNE VULGARIS: ICD-10-CM | Status: IMPROVED

## 2021-09-15 PROCEDURE — 99214 OFFICE O/P EST MOD 30 MIN: CPT

## 2021-09-15 PROCEDURE — ? TOPICAL RETINOID COUNSELING

## 2021-09-15 PROCEDURE — ? PRESCRIPTION

## 2021-09-15 PROCEDURE — ? TREATMENT REGIMEN

## 2021-09-15 PROCEDURE — ? COUNSELING

## 2021-09-15 PROCEDURE — ? PRESCRIPTION MEDICATION MANAGEMENT

## 2021-09-15 RX ORDER — DOXYCYCLINE HYCLATE 150 MG/1
TABLET, FILM COATED ORAL QDAY
Qty: 30 | Refills: 0 | Status: ERX

## 2021-09-15 RX ORDER — ADAPALENE 3 MG/G
GEL TOPICAL QAM
Qty: 45 | Refills: 3 | Status: ERX | COMMUNITY
Start: 2021-09-15

## 2021-09-15 RX ADMIN — ADAPALENE: 3 GEL TOPICAL at 00:00

## 2021-09-15 ASSESSMENT — LOCATION DETAILED DESCRIPTION DERM: LOCATION DETAILED: LEFT CENTRAL MALAR CHEEK

## 2021-09-15 ASSESSMENT — LOCATION SIMPLE DESCRIPTION DERM: LOCATION SIMPLE: LEFT CHEEK

## 2021-09-15 ASSESSMENT — LOCATION ZONE DERM: LOCATION ZONE: FACE

## 2021-09-15 NOTE — PROCEDURE: PRESCRIPTION MEDICATION MANAGEMENT
Continue Regimen: SulfaCleanse 8-4 8 %-4 % topical suspension: Wash face qday; doxycycline hyclate 150 mg tablet: Take 1 tab po Qday; dapsone 5 % topical gel: Apply to face QHS
Initiate Treatment: adapalene 0.3 % topical gel: Apply a thin layer to face QAM
Detail Level: Zone
Render In Strict Bullet Format?: No
Plan: Will discontinue doxy at next visit if acne is well controlled

## 2021-12-03 ENCOUNTER — RX ONLY (OUTPATIENT)
Age: 36
Setting detail: RX ONLY
End: 2021-12-03

## 2021-12-03 RX ORDER — DOXYCYCLINE HYCLATE 150 MG/1
TABLET, FILM COATED ORAL QDAY
Qty: 30 | Refills: 0 | Status: ERX

## 2021-12-03 RX ORDER — ADAPALENE 3 MG/G
GEL TOPICAL QAM
Qty: 45 | Refills: 3 | Status: ERX

## 2022-02-23 ENCOUNTER — APPOINTMENT (RX ONLY)
Dept: URBAN - METROPOLITAN AREA CLINIC 28 | Facility: CLINIC | Age: 37
Setting detail: DERMATOLOGY
End: 2022-02-23

## 2022-02-23 ENCOUNTER — RX ONLY (OUTPATIENT)
Age: 37
Setting detail: RX ONLY
End: 2022-02-23

## 2022-02-23 DIAGNOSIS — L70.0 ACNE VULGARIS: ICD-10-CM | Status: WORSENING

## 2022-02-23 PROCEDURE — 99214 OFFICE O/P EST MOD 30 MIN: CPT

## 2022-02-23 PROCEDURE — ? TREATMENT REGIMEN

## 2022-02-23 PROCEDURE — ? PRESCRIPTION

## 2022-02-23 PROCEDURE — ? MEDICATION COUNSELING

## 2022-02-23 PROCEDURE — ? PRESCRIPTION MEDICATION MANAGEMENT

## 2022-02-23 PROCEDURE — ? ADDITIONAL NOTES

## 2022-02-23 PROCEDURE — ? COUNSELING

## 2022-02-23 RX ORDER — SODIUM SULFACETAMIDE AND SULFUR 80; 40 MG/ML; MG/ML
LOTION TOPICAL BID
Qty: 473 | Refills: 3 | Status: CANCELLED

## 2022-02-23 RX ORDER — AZELAIC ACID 0.15 G/G
GEL TOPICAL
Qty: 50 | Refills: 3 | Status: ERX | COMMUNITY
Start: 2022-02-23

## 2022-02-23 RX ORDER — AZELAIC ACID 0.15 G/G
AEROSOL, FOAM TOPICAL QHS
Qty: 50 | Refills: 3 | Status: CANCELLED | COMMUNITY
Start: 2022-02-23

## 2022-02-23 RX ADMIN — AZELAIC ACID: 0.15 AEROSOL, FOAM TOPICAL at 00:00

## 2022-02-23 ASSESSMENT — LOCATION DETAILED DESCRIPTION DERM: LOCATION DETAILED: LEFT CENTRAL MALAR CHEEK

## 2022-02-23 ASSESSMENT — LOCATION SIMPLE DESCRIPTION DERM: LOCATION SIMPLE: LEFT CHEEK

## 2022-02-23 ASSESSMENT — LOCATION ZONE DERM: LOCATION ZONE: FACE

## 2022-02-23 NOTE — PROCEDURE: PRESCRIPTION MEDICATION MANAGEMENT
Continue Regimen: SulfaCleanse 8-4 8 %-4 % topical suspension: Wash face BID
Initiate Treatment: Finacea 15 % topical foam: Apply a thin layer to face QHS after washing face
Detail Level: Zone
Discontinue Regimen: adapalene 0.3 % topical gel: Apply a thin layer to face QAM\\ndoxycycline hyclate 150 mg tablet: Take 1 tab po Qday\\ndapsone 5 % topical gel: Apply to face QHS
Render In Strict Bullet Format?: No

## 2022-02-23 NOTE — PROCEDURE: ADDITIONAL NOTES
Additional Notes: Patient consent was obtained to proceed with the visit and recommended plan of care after discussion of all risks and benefits, including the risks of COVID-19 exposure.
Detail Level: Simple
Additional Notes: Patient is currently pregnant with twins.
Render Risk Assessment In Note?: no
Detail Level: Zone

## 2022-02-23 NOTE — PROCEDURE: MEDICATION COUNSELING
65M PMH ACC/AHA stage D HF due to NICM HM2 LVAD , TV annuloplasty ring 9/12/17 as destination therapy due to severe peripheral artery disease with significant stenosis  SIADH, Depression, CKD-3 with hyperkalemia, past E. coli UTIs, driveline drainage (1/7/21) and COVID-19, here initially for GIB prolonged hospital course, s/p tracheostomy, acalculous cholecystitis s/p perc dawn. Patient has persistent intermittent abdominal pain, and was being evaluated for chronic mesenteric ischemia vs SMA syndrome.  8/13 bld cxs positive. TPN was consulted for Protein Calorie Malnutrition, Prealb 11, prior a1c 5.6, tg 141. Passed FEES and started on dysphagia diet as supplement to ngt feeds    Current diet: pureed with mildly thick liquids + ngt feeds vital 1.5 (goal 50cc/hr)    -Cont tube feeds + PO dysphagia diet (as per speech recs) as tolerated as per CTU  -Abdominal pain/vomiting- multifactorial, improved; sp sma stent by vascular  -Hyperthyroidism- improved, care per endo  -Hyponatremia- care per renal  -Electrolyte imbalance risk- monitor and replete elytes as needed  -Anemia- rec iron supp when feasible  -Further care as per CTU; will remain available as needed    plan discussed with Dr. Soliz and Dr ANNE Tang, agreeable with above    TPN pager [phone], spectra [phone]  M-F 6A-4P, Weekends and holidays 8A-12P     65M PMH ACC/AHA stage D HF due to NICM HM2 LVAD , TV annuloplasty ring 9/12/17 as destination therapy due to severe peripheral artery disease with significant stenosis  SIADH, Depression, CKD-3 with hyperkalemia, past E. coli UTIs, driveline drainage (1/7/21) and COVID-19, here initially for GIB prolonged hospital course, s/p tracheostomy, acalculous cholecystitis s/p perc dawn. Patient has persistent intermittent abdominal pain, and was being evaluated for chronic mesenteric ischemia vs SMA syndrome.  8/13 bld cxs positive. TPN was consulted for Protein Calorie Malnutrition, Prealb 11, prior a1c 5.6, tg 141. Passed FEES and started on dysphagia diet as supplement to ngt feeds    Current diet:  ngt feeds vital 1.5 (goal 50cc/hr) + pureed with mildly thick liquids     -Cont tube feeds + PO dysphagia diet (as per speech recs) as tolerated as per CTU  -Abdominal pain/vomiting- multifactorial, improved; sp sma stent by vascular  -Hyperthyroidism- improved, care per endo  -Hyponatremia- renal following  -Electrolyte imbalance risk- monitor and replete elytes as needed  -Further care as per CTU; will remain available as needed    plan discussed with Dr. Soliz and Dr ANNE Tang, agreeable with above    TPN pager [phone], spectra [phone]  M-F 6A-4P, Weekends and holidays 8A-12P     Azelaic Acid Counseling: Patient counseled that medicine may cause skin irritation and to avoid applying near the eyes.  In the event of skin irritation, the patient was advised to reduce the amount of the drug applied or use it less frequently.   The patient verbalized understanding of the proper use and possible adverse effects of azelaic acid.  All of the patient's questions and concerns were addressed.

## 2022-02-28 ENCOUNTER — OFFICE VISIT (OUTPATIENT)
Dept: FAMILY MEDICINE | Facility: CLINIC | Age: 37
End: 2022-02-28
Payer: COMMERCIAL

## 2022-02-28 VITALS
HEIGHT: 66 IN | SYSTOLIC BLOOD PRESSURE: 118 MMHG | HEART RATE: 91 BPM | DIASTOLIC BLOOD PRESSURE: 64 MMHG | TEMPERATURE: 97.6 F | WEIGHT: 153 LBS | OXYGEN SATURATION: 95 % | BODY MASS INDEX: 24.59 KG/M2

## 2022-02-28 DIAGNOSIS — Z11.3 ENCOUNTER FOR SCREENING EXAMINATION FOR SEXUALLY TRANSMITTED DISEASE: ICD-10-CM

## 2022-02-28 DIAGNOSIS — Z11.1 SCREENING FOR TUBERCULOSIS: ICD-10-CM

## 2022-02-28 DIAGNOSIS — Z00.00 ROUTINE MEDICAL EXAM: Primary | ICD-10-CM

## 2022-02-28 PROBLEM — U07.1 COVID-19 VIRUS INFECTION: Status: RESOLVED | Noted: 2021-02-11 | Resolved: 2022-02-28

## 2022-02-28 PROCEDURE — 99395 PREV VISIT EST AGE 18-39: CPT | Performed by: FAMILY MEDICINE

## 2022-02-28 PROCEDURE — 3008F BODY MASS INDEX DOCD: CPT | Performed by: FAMILY MEDICINE

## 2022-03-01 ENCOUNTER — RX ONLY (OUTPATIENT)
Age: 37
Setting detail: RX ONLY
End: 2022-03-01

## 2022-03-01 RX ORDER — SODIUM SULFACETAMIDE AND SULFUR 80; 40 MG/ML; MG/ML
LOTION TOPICAL
Qty: 473 | Refills: 5 | Status: ERX

## 2022-05-04 ENCOUNTER — APPOINTMENT (RX ONLY)
Dept: URBAN - METROPOLITAN AREA CLINIC 28 | Facility: CLINIC | Age: 37
Setting detail: DERMATOLOGY
End: 2022-05-04

## 2022-05-04 DIAGNOSIS — L70.0 ACNE VULGARIS: ICD-10-CM

## 2022-05-04 DIAGNOSIS — L73.8 OTHER SPECIFIED FOLLICULAR DISORDERS: ICD-10-CM

## 2022-05-04 PROCEDURE — ? COUNSELING

## 2022-05-04 PROCEDURE — 99214 OFFICE O/P EST MOD 30 MIN: CPT | Mod: 25

## 2022-05-04 PROCEDURE — ? TREATMENT REGIMEN

## 2022-05-04 PROCEDURE — 17110 DESTRUCTION B9 LES UP TO 14: CPT

## 2022-05-04 PROCEDURE — ? ADDITIONAL NOTES

## 2022-05-04 PROCEDURE — ? ELECTRODESICCATION

## 2022-05-04 PROCEDURE — ? PRESCRIPTION MEDICATION MANAGEMENT

## 2022-05-04 PROCEDURE — ? PRESCRIPTION

## 2022-05-04 PROCEDURE — ? PHOTO-DOCUMENTATION

## 2022-05-04 PROCEDURE — ? MEDICATION COUNSELING

## 2022-05-04 RX ORDER — BENZOYL PEROXIDE 100 MG/ML
LIQUID TOPICAL QAM
Qty: 237 | Refills: 3 | Status: ERX | COMMUNITY
Start: 2022-05-04

## 2022-05-04 RX ORDER — CLINDAMYCIN PHOSPHATE 10 MG/ML
SOLUTION TOPICAL QHS
Qty: 60 | Refills: 3 | Status: ERX | COMMUNITY
Start: 2022-05-04

## 2022-05-04 RX ADMIN — BENZOYL PEROXIDE: 100 LIQUID TOPICAL at 00:00

## 2022-05-04 RX ADMIN — CLINDAMYCIN PHOSPHATE: 10 SOLUTION TOPICAL at 00:00

## 2022-05-04 ASSESSMENT — LOCATION ZONE DERM
LOCATION ZONE: NOSE
LOCATION ZONE: FACE

## 2022-05-04 ASSESSMENT — LOCATION DETAILED DESCRIPTION DERM
LOCATION DETAILED: LEFT NASAL ALA
LOCATION DETAILED: LEFT CENTRAL MALAR CHEEK

## 2022-05-04 ASSESSMENT — LOCATION SIMPLE DESCRIPTION DERM
LOCATION SIMPLE: LEFT CHEEK
LOCATION SIMPLE: LEFT NOSE

## 2022-05-04 NOTE — PROCEDURE: ELECTRODESICCATION
Post-Care Instructions: I reviewed with the patient in detail post-care instructions. Patient is to wear sunprotection, and avoid picking at any of the treated lesions. Pt may apply Vaseline to crusted or scabbing areas
Medical Necessity Clause: This procedure was medically necessary because the lesions that were treated were:
Medical Necessity Information: It is in your best interest to select a reason for this procedure from the list below. All of these items fulfill various CMS LCD requirements except the new and changing color options.
Detail Level: Simple
Consent: The patient's consent was obtained including but not limited to risks of crusting, scabbing, blistering, scarring, darker or lighter pigmentary change, recurrence, incomplete removal and infection.
Osborn: 2
Include Z78.9 (Other Specified Conditions Influencing Health Status) As An Associated Diagnosis?: No
Single

## 2022-05-04 NOTE — PROCEDURE: MEDICATION COUNSELING
[facility] Hydroquinone Counseling:  Patient advised that medication may result in skin irritation, lightening (hypopigmentation), dryness, and burning.  In the event of skin irritation, the patient was advised to reduce the amount of the drug applied or use it less frequently.  Rarely, spots that are treated with hydroquinone can become darker (pseudoochronosis).  Should this occur, patient instructed to stop medication and call the office. The patient verbalized understanding of the proper use and possible adverse effects of hydroquinone.  All of the patient's questions and concerns were addressed.

## 2022-05-04 NOTE — PROCEDURE: ADDITIONAL NOTES
Additional Notes: Patient is currently pregnant with twins.
Render Risk Assessment In Note?: no
Detail Level: Zone

## 2022-05-04 NOTE — PROCEDURE: COUNSELING
Doxycycline Pregnancy And Lactation Text: This medication is Pregnancy Category D and not consider safe during pregnancy. It is also excreted in breast milk but is considered safe for shorter treatment courses.
Azithromycin Counseling:  I discussed with the patient the risks of azithromycin including but not limited to GI upset, allergic reaction, drug rash, diarrhea, and yeast infections.
Tetracycline Pregnancy And Lactation Text: This medication is Pregnancy Category D and not consider safe during pregnancy. It is also excreted in breast milk.
Tetracycline Counseling: Patient counseled regarding possible photosensitivity and increased risk for sunburn.  Patient instructed to avoid sunlight, if possible.  When exposed to sunlight, patients should wear protective clothing, sunglasses, and sunscreen.  The patient was instructed to call the office immediately if the following severe adverse effects occur:  hearing changes, easy bruising/bleeding, severe headache, or vision changes.  The patient verbalized understanding of the proper use and possible adverse effects of tetracycline.  All of the patient's questions and concerns were addressed. Patient understands to avoid pregnancy while on therapy due to potential birth defects.
Include Pregnancy/Lactation Warning?: No
Isotretinoin Pregnancy And Lactation Text: This medication is Pregnancy Category X and is considered extremely dangerous during pregnancy. It is unknown if it is excreted in breast milk.
Topical Retinoid Pregnancy And Lactation Text: This medication is Pregnancy Category C. It is unknown if this medication is excreted in breast milk.
Sarecycline Counseling: Patient advised regarding possible photosensitivity and discoloration of the teeth, skin, lips, tongue and gums.  Patient instructed to avoid sunlight, if possible.  When exposed to sunlight, patients should wear protective clothing, sunglasses, and sunscreen.  The patient was instructed to call the office immediately if the following severe adverse effects occur:  hearing changes, easy bruising/bleeding, severe headache, or vision changes.  The patient verbalized understanding of the proper use and possible adverse effects of sarecycline.  All of the patient's questions and concerns were addressed.
Topical Sulfur Applications Counseling: Topical Sulfur Counseling: Patient counseled that this medication may cause skin irritation or allergic reactions.  In the event of skin irritation, the patient was advised to reduce the amount of the drug applied or use it less frequently.   The patient verbalized understanding of the proper use and possible adverse effects of topical sulfur application.  All of the patient's questions and concerns were addressed.
Azithromycin Pregnancy And Lactation Text: This medication is considered safe during pregnancy and is also secreted in breast milk.
Tazorac Counseling:  Patient advised that medication is irritating and drying.  Patient may need to apply sparingly and wash off after an hour before eventually leaving it on overnight.  The patient verbalized understanding of the proper use and possible adverse effects of tazorac.  All of the patient's questions and concerns were addressed.
Benzoyl Peroxide Counseling: Patient counseled that medicine may cause skin irritation and bleach clothing.  In the event of skin irritation, the patient was advised to reduce the amount of the drug applied or use it less frequently.   The patient verbalized understanding of the proper use and possible adverse effects of benzoyl peroxide.  All of the patient's questions and concerns were addressed.
High Dose Vitamin A Pregnancy And Lactation Text: High dose vitamin A therapy is contraindicated during pregnancy and breast feeding.
High Dose Vitamin A Counseling: Side effects reviewed, pt to contact office should one occur.
Birth Control Pills Pregnancy And Lactation Text: This medication should be avoided if pregnant and for the first 30 days post-partum.
Topical Sulfur Applications Pregnancy And Lactation Text: This medication is Pregnancy Category C and has an unknown safety profile during pregnancy. It is unknown if this topical medication is excreted in breast milk.
Erythromycin Counseling:  I discussed with the patient the risks of erythromycin including but not limited to GI upset, allergic reaction, drug rash, diarrhea, increase in liver enzymes, and yeast infections.
Erythromycin Pregnancy And Lactation Text: This medication is Pregnancy Category B and is considered safe during pregnancy. It is also excreted in breast milk.
Tazorac Pregnancy And Lactation Text: This medication is not safe during pregnancy. It is unknown if this medication is excreted in breast milk.
Minocycline Counseling: Patient advised regarding possible photosensitivity and discoloration of the teeth, skin, lips, tongue and gums.  Patient instructed to avoid sunlight, if possible.  When exposed to sunlight, patients should wear protective clothing, sunglasses, and sunscreen.  The patient was instructed to call the office immediately if the following severe adverse effects occur:  hearing changes, easy bruising/bleeding, severe headache, or vision changes.  The patient verbalized understanding of the proper use and possible adverse effects of minocycline.  All of the patient's questions and concerns were addressed.
Dapsone Pregnancy And Lactation Text: This medication is Pregnancy Category C and is not considered safe during pregnancy or breast feeding.
Dapsone Counseling: I discussed with the patient the risks of dapsone including but not limited to hemolytic anemia, agranulocytosis, rashes, methemoglobinemia, kidney failure, peripheral neuropathy, headaches, GI upset, and liver toxicity.  Patients who start dapsone require monitoring including baseline LFTs and weekly CBCs for the first month, then every month thereafter.  The patient verbalized understanding of the proper use and possible adverse effects of dapsone.  All of the patient's questions and concerns were addressed.
Benzoyl Peroxide Pregnancy And Lactation Text: This medication is Pregnancy Category C. It is unknown if benzoyl peroxide is excreted in breast milk.
Bactrim Counseling:  I discussed with the patient the risks of sulfa antibiotics including but not limited to GI upset, allergic reaction, drug rash, diarrhea, dizziness, photosensitivity, and yeast infections.  Rarely, more serious reactions can occur including but not limited to aplastic anemia, agranulocytosis, methemoglobinemia, blood dyscrasias, liver or kidney failure, lung infiltrates or desquamative/blistering drug rashes.
Spironolactone Counseling: Patient advised regarding risks of diarrhea, abdominal pain, hyperkalemia, birth defects (for female patients), liver toxicity and renal toxicity. The patient may need blood work to monitor liver and kidney function and potassium levels while on therapy. The patient verbalized understanding of the proper use and possible adverse effects of spironolactone.  All of the patient's questions and concerns were addressed.
Detail Level: Zone
Doxycycline Counseling:  Patient counseled regarding possible photosensitivity and increased risk for sunburn.  Patient instructed to avoid sunlight, if possible.  When exposed to sunlight, patients should wear protective clothing, sunglasses, and sunscreen.  The patient was instructed to call the office immediately if the following severe adverse effects occur:  hearing changes, easy bruising/bleeding, severe headache, or vision changes.  The patient verbalized understanding of the proper use and possible adverse effects of doxycycline.  All of the patient's questions and concerns were addressed.
Topical Clindamycin Counseling: Patient counseled that this medication may cause skin irritation or allergic reactions.  In the event of skin irritation, the patient was advised to reduce the amount of the drug applied or use it less frequently.   The patient verbalized understanding of the proper use and possible adverse effects of clindamycin.  All of the patient's questions and concerns were addressed.
Birth Control Pills Counseling: Birth Control Pill Counseling: I discussed with the patient the potential side effects of OCPs including but not limited to increased risk of stroke, heart attack, thrombophlebitis, deep venous thrombosis, hepatic adenomas, breast changes, GI upset, headaches, and depression.  The patient verbalized understanding of the proper use and possible adverse effects of OCPs. All of the patient's questions and concerns were addressed.
Topical Retinoid counseling:  Patient advised to apply a pea-sized amount only at bedtime and wait 30 minutes after washing their face before applying.  If too drying, patient may add a non-comedogenic moisturizer. The patient verbalized understanding of the proper use and possible adverse effects of retinoids.  All of the patient's questions and concerns were addressed.
Bactrim Pregnancy And Lactation Text: This medication is Pregnancy Category D and is known to cause fetal risk.  It is also excreted in breast milk.
Spironolactone Pregnancy And Lactation Text: This medication can cause feminization of the male fetus and should be avoided during pregnancy. The active metabolite is also found in breast milk.
Topical Clindamycin Pregnancy And Lactation Text: This medication is Pregnancy Category B and is considered safe during pregnancy. It is unknown if it is excreted in breast milk.
Isotretinoin Counseling: Patient should get monthly blood tests, not donate blood, not drive at night if vision affected, not share medication, and not undergo elective surgery for 6 months after tx completed. Side effects reviewed, pt to contact office should one occur.
Detail Level: Detailed
Azelaic Acid Counseling: Patient counseled that medicine may cause skin irritation and to avoid applying near the eyes.  In the event of skin irritation, the patient was advised to reduce the amount of the drug applied or use it less frequently.   The patient verbalized understanding of the proper use and possible adverse effects of azelaic acid.  All of the patient's questions and concerns were addressed.
Winlevi Counseling:  I discussed with the patient the risks of topical clascoterone including but not limited to erythema, scaling, itching, and stinging. Patient voiced their understanding.
Azelaic Acid Pregnancy And Lactation Text: This medication is considered safe during pregnancy and breast feeding.
Winlevi Pregnancy And Lactation Text: This medication is considered safe during pregnancy and breastfeeding.

## 2022-05-04 NOTE — PROCEDURE: MEDICATION COUNSELING
[No Acute Distress] : no acute distress [EOMI] : EOMI [Clear TM bilaterally] : clear tympanic membranes bilaterally [Clear Rhinorrhea] : clear rhinorrhea [Nonerythematous Oropharynx] : nonerythematous oropharynx [Enlarged Tonsils] : enlarged tonsils  [+3] :  ( +3 ) [Clear to Ausculatation Bilaterally] : clear to auscultation bilaterally [Regular Rate and Rhythm] : regular rate and rhythm [Warm] : warm Stelara Pregnancy And Lactation Text: This medication is Pregnancy Category B and is considered safe during pregnancy. It is unknown if this medication is excreted in breast milk.

## 2022-05-04 NOTE — PROCEDURE: PRESCRIPTION MEDICATION MANAGEMENT
Continue Regimen: SulfaCleanse 8-4 8%-4% topical suspension: Wash face BID\\nFinacea 15% topical foam: Apply a thin layer to face QHS after washing face
Initiate Treatment: benzoyl peroxide 10% topical cleanser: Wash face and chest Qam in the shower, let sit for 3-5 minutes then rinse off\\nclindamycin phosphate 1% topical swab: Wipe QAM to the acne of the face and body after using BPO Wash
Detail Level: Zone
Render In Strict Bullet Format?: No

## 2022-05-15 ENCOUNTER — APPOINTMENT (EMERGENCY)
Dept: RADIOLOGY | Facility: HOSPITAL | Age: 37
End: 2022-05-15
Attending: STUDENT IN AN ORGANIZED HEALTH CARE EDUCATION/TRAINING PROGRAM
Payer: COMMERCIAL

## 2022-05-15 ENCOUNTER — HOSPITAL ENCOUNTER (EMERGENCY)
Facility: HOSPITAL | Age: 37
Discharge: HOME | End: 2022-05-15
Attending: EMERGENCY MEDICINE
Payer: COMMERCIAL

## 2022-05-15 ENCOUNTER — APPOINTMENT (EMERGENCY)
Dept: RADIOLOGY | Facility: HOSPITAL | Age: 37
End: 2022-05-15
Payer: COMMERCIAL

## 2022-05-15 VITALS
SYSTOLIC BLOOD PRESSURE: 123 MMHG | WEIGHT: 160 LBS | HEART RATE: 86 BPM | OXYGEN SATURATION: 100 % | BODY MASS INDEX: 27.31 KG/M2 | TEMPERATURE: 97.5 F | DIASTOLIC BLOOD PRESSURE: 60 MMHG | HEIGHT: 64 IN | RESPIRATION RATE: 24 BRPM

## 2022-05-15 DIAGNOSIS — S53.105A DISLOCATION OF LEFT ELBOW, INITIAL ENCOUNTER: Primary | ICD-10-CM

## 2022-05-15 PROCEDURE — 99283 EMERGENCY DEPT VISIT LOW MDM: CPT | Mod: 25

## 2022-05-15 PROCEDURE — 0RSMXZZ REPOSITION LEFT ELBOW JOINT, EXTERNAL APPROACH: ICD-10-PCS | Performed by: ORTHOPAEDIC SURGERY

## 2022-05-15 PROCEDURE — 73070 X-RAY EXAM OF ELBOW: CPT | Mod: LT

## 2022-05-15 PROCEDURE — 63600000 HC DRUGS/DETAIL CODE

## 2022-05-15 PROCEDURE — 24600 TX CLSD ELBOW DISLC W/O ANES: CPT | Mod: LT

## 2022-05-15 PROCEDURE — 73090 X-RAY EXAM OF FOREARM: CPT | Mod: LT

## 2022-05-15 PROCEDURE — 25000000 HC PHARMACY GENERAL: Performed by: STUDENT IN AN ORGANIZED HEALTH CARE EDUCATION/TRAINING PROGRAM

## 2022-05-15 RX ORDER — FENTANYL CITRATE 50 UG/ML
INJECTION, SOLUTION INTRAMUSCULAR; INTRAVENOUS
Status: COMPLETED
Start: 2022-05-15 | End: 2022-05-15

## 2022-05-15 RX ORDER — PROPOFOL 200MG/20ML
SYRINGE (ML) INTRAVENOUS
Status: DISCONTINUED
Start: 2022-05-15 | End: 2022-05-15 | Stop reason: WASHOUT

## 2022-05-15 RX ORDER — OXYCODONE AND ACETAMINOPHEN 5; 325 MG/1; MG/1
1 TABLET ORAL EVERY 6 HOURS PRN
Qty: 8 TABLET | Refills: 0 | Status: SHIPPED | OUTPATIENT
Start: 2022-05-15 | End: 2022-10-27 | Stop reason: ALTCHOICE

## 2022-05-15 RX ORDER — FENTANYL CITRATE 50 UG/ML
25 INJECTION, SOLUTION INTRAMUSCULAR; INTRAVENOUS ONCE
Status: COMPLETED | OUTPATIENT
Start: 2022-05-15 | End: 2022-05-15

## 2022-05-15 RX ORDER — ACETAMINOPHEN 325 MG/1
650 TABLET ORAL ONCE
Status: DISCONTINUED | OUTPATIENT
Start: 2022-05-15 | End: 2022-05-15

## 2022-05-15 RX ORDER — LIDOCAINE HYDROCHLORIDE 10 MG/ML
20 INJECTION, SOLUTION EPIDURAL; INFILTRATION; INTRACAUDAL; PERINEURAL ONCE
Status: COMPLETED | OUTPATIENT
Start: 2022-05-15 | End: 2022-05-15

## 2022-05-15 RX ADMIN — LIDOCAINE HYDROCHLORIDE 20 ML: 10 INJECTION, SOLUTION EPIDURAL; INFILTRATION; INTRACAUDAL; PERINEURAL at 14:09

## 2022-05-15 RX ADMIN — FENTANYL CITRATE 25 MCG: 50 INJECTION, SOLUTION INTRAMUSCULAR; INTRAVENOUS at 14:15

## 2022-05-15 RX ADMIN — FENTANYL CITRATE 25 MCG: 50 INJECTION INTRAMUSCULAR; INTRAVENOUS at 14:15

## 2022-05-15 ASSESSMENT — ENCOUNTER SYMPTOMS
MUSCLE WEAKNESS: 0
SHORTNESS OF BREATH: 0
NECK PAIN: 0
NUMBNESS: 0
BACK PAIN: 0
TROUBLE SWALLOWING: 0
WOUND: 0
WEAKNESS: 0
ABDOMINAL PAIN: 0
HEADACHES: 0
EXTREMITY NUMBNESS: 0

## 2022-05-15 NOTE — DISCHARGE INSTRUCTIONS
Call to follow up with Dr. Black in 7 to 10 days.  Keep the splint on until you follow-up.  Return to the ER for concerning symptoms.  
never used

## 2022-05-15 NOTE — LETTER
May 15, 2022    Patient: Robert Palacio   YOB: 1985   Date of Visit: 5/15/2022       To Whom It May Concern:    Robert Palacio was seen and treated in our emergency department on 5/15/2022. She can return 5/17/2022 if needed.     If you have any questions or concerns, please don't hesitate to call.              CC: No Recipients

## 2022-05-15 NOTE — ED PROVIDER NOTES
Emergency Medicine Note  HPI   HISTORY OF PRESENT ILLNESS     Putting down bags and lost balance and fell back onto left arm on carpet.  21 weeks pregnant with twins.  No other injury.  Swelling to left elbow.  No numbness/weakness.  No laceration.  No head injury.  No abdominal cramping/vaginal bleeding.        History provided by:  Patient  Upper Extremity Issue  Location:  Elbow  Elbow location:  L elbow  Injury: yes    Time since incident:  1 hour  Mechanism of injury: fall    Fall:     Fall occurred: lost balance.    Impact surface:  Carpet    Point of impact:  Outstretched arms  Pain details:     Severity:  Severe    Onset quality:  Sudden    Duration:  1 hour    Timing:  Constant    Progression:  Unchanged  Dislocation: yes    Relieved by:  Nothing  Worsened by:  Movement  Ineffective treatments:  None tried  Associated symptoms: decreased range of motion and swelling    Associated symptoms: no back pain, no muscle weakness, no neck pain, no numbness and no tingling    Trauma  Mechanism of injury: fall     Current symptoms:       Associated symptoms:             Denies abdominal pain, back pain, chest pain, headache and neck pain.         Patient History   PAST HISTORY     Reviewed from Nursing Triage:  Tobacco  Allergies  Meds  Problems  Med Hx  Surg Hx  Fam Hx  Soc   Hx        History reviewed. No pertinent past medical history.    Past Surgical History:   Procedure Laterality Date   • NO PAST SURGERIES         Family History   Problem Relation Age of Onset   • Adrenal disorder Biological Father    • Diabetes Biological Father    • Hypertension Biological Father    • Diabetes Biological Mother    • Hypertension Biological Mother        Social History     Tobacco Use   • Smoking status: Never Smoker   • Smokeless tobacco: Never Used   Substance Use Topics   • Alcohol use: Yes     Comment: social    • Drug use: No         Review of Systems   REVIEW OF SYSTEMS     Review of Systems   HENT: Negative for  trouble swallowing.    Respiratory: Negative for shortness of breath.    Cardiovascular: Negative for chest pain.   Gastrointestinal: Negative for abdominal pain.   Genitourinary: Negative for pelvic pain and vaginal bleeding.   Musculoskeletal: Negative for back pain and neck pain.   Skin: Negative for wound.   Neurological: Negative for syncope, weakness, numbness and headaches.   All other systems reviewed and are negative.        VITALS     ED Vitals    Date/Time Temp Pulse Resp BP SpO2 [facility]   05/15/22 1443 -- 86 24 123/60 100 %    05/15/22 1415 -- 81 22 119/59 100 %    05/15/22 1343 -- 101 13 120/56 100 %    05/15/22 1213 36.4 °C (97.5 °F) 94 18 107/61 95 % LMD                       Physical Exam   PHYSICAL EXAM     Physical Exam  Vitals and nursing note reviewed.   Constitutional:       General: She is not in acute distress.     Appearance: Normal appearance. She is not ill-appearing.   HENT:      Head: Normocephalic and atraumatic.      Mouth/Throat:      Pharynx: Oropharynx is clear.   Eyes:      Conjunctiva/sclera: Conjunctivae normal.      Pupils: Pupils are equal, round, and reactive to light.   Cardiovascular:      Rate and Rhythm: Normal rate and regular rhythm.      Pulses: Normal pulses.      Heart sounds: Normal heart sounds. No murmur heard.  Pulmonary:      Effort: Pulmonary effort is normal.      Breath sounds: Normal breath sounds.   Abdominal:      Palpations: Abdomen is soft.      Tenderness: There is no abdominal tenderness.   Musculoskeletal:         General: Tenderness (  left elbow with swelling and limited ROM.  Also diffuse tenderness) present.      Cervical back: No tenderness.      Right lower leg: No edema.      Left lower leg: No edema.   Skin:     General: Skin is warm and dry.      Coloration: Skin is not pale.      Findings: No erythema or rash.   Neurological:      Mental Status: She is alert and oriented to person, place, and time.      Sensory: No sensory deficit.       Motor: No weakness.   Psychiatric:         Mood and Affect: Mood normal.           PROCEDURES     Procedures     DATA     Results     None          Imaging Results          X-RAY ELBOW LEFT 2 VIEWS (Preliminary result)  Result time 05/15/22 15:37:07    ED Interpretation    Reduced                               X-RAY FOREARM LEFT 2 VIEWS (Final result)  Result time 05/15/22 12:48:59    Final result                 Impression:    IMPRESSION: Dislocation left elbow.  COMMENT: Single lateral image of the left elbow on two views of the left  forearm/wrist demonstrate dislocation of the left elbow.  The distal left  humerus lies anterior to the radial head and olecranon.  No definite displaced  fracture seen.  There is a joint effusion.  Distal forearm is unremarkable.             Narrative:    CLINICAL HISTORY: Pain and swelling.                             X-RAY ELBOW LEFT 2 VIEWS (Final result)  Result time 05/15/22 12:48:59   Procedure changed from X-RAY ELBOW LEFT 3+ VIEWS     Final result                 Impression:    IMPRESSION: Dislocation left elbow.  COMMENT: Single lateral image of the left elbow on two views of the left  forearm/wrist demonstrate dislocation of the left elbow.  The distal left  humerus lies anterior to the radial head and olecranon.  No definite displaced  fracture seen.  There is a joint effusion.  Distal forearm is unremarkable.             Narrative:    CLINICAL HISTORY: Pain and swelling.                              No orders to display       Scoring tools                                 ED Course & MDM   MDM / ED COURSE / CLINICAL IMPRESSIONS / DISPO     MDM    ED Course as of 05/15/22 1546   Sun May 15, 2022   1307 D/w ortho - will eval.  OB paged to discuss sedation options.   [GURVINDER]   1317 D/w ob resident - case discussed.  They are discussing case with the team and will get back to me.   [GURVINDER]   1321 D/w ob - recommend propofol and opiate if conscious sedation needed.  Patient dated by  US on Tuesday per patient.  21 weeks 4 days today.  Ob recommends fetal heart tones prior to conscious sedation and after if conscious sedation is needed. She doesn't need fetal monitoring. [GURVINDER]   1405 Fetal heart tones Twin A 160; Twin B 137.  Patient consented for conscious sedation if needed.  Ortho trying hematoma block first with a dose of pain medication.   [GURVINDER]   1428 Ortho reduced the elbow with a hematoma block. No sedation required.  Patient got a small dose of narcotic pain medication.   [GURVINDER]   1453 Ortho splinted.  F/u with Dr. Black in 7-10 days for splint removal.   [GURVINDER]      ED Course User Index  [GURVINDER] Ankit Arreguin MD         Clinical Impressions as of 05/15/22 1546   Dislocation of left elbow, initial encounter     Discharge         Ankit Arreguin MD  05/15/22 [id number]

## 2022-05-15 NOTE — CONSULTS
Orthopedic Consult Note    Subjective     37F FFSH backwards onto L arm.  She has a grossly dislocated L elbow.  She denies any other injuries.  She admits some tingling finger tips but denies true numbness or weakness.      Pt is 21 weeks pregnant with twins.  No issues with cramping/vaginal bleeding on exam.      Pertinent radiology results reviewed..    Medical History: History reviewed. No pertinent past medical history.    Surgical History:   Past Surgical History:   Procedure Laterality Date   • NO PAST SURGERIES         Social History:   Social History     Social History Narrative   • Not on file       Family History:   Family History   Problem Relation Age of Onset   • Adrenal disorder Biological Father    • Diabetes Biological Father    • Hypertension Biological Father    • Diabetes Biological Mother    • Hypertension Biological Mother        Allergies: Penicillins    Current Inpatient Medications   Medication Dose Route Frequency Provider Last Rate Last Admin   • propofol (DIPRIVAN) 200 mg/20 mL (10 mg/mL) IV bolus injection  - Pyxis Override Pull                 Medication List      ASK your doctor about these medications    drospirenone-ethinyl estradioL 3-0.03 mg per tablet  Commonly known as: MAUDE,OCELLA  Take 1 tablet by mouth once daily.  Dose: 1 tablet          Review of Systems  Pertinent items are noted in HPI.    Objective   Labs  CBC Results       07/29/21     1054    WBC 9.7    RBC 4.07    HGB 12.2    HCT 35.6    MCV 88    MCH 30.0    MCHC 34.3            BMP Results       07/29/21     1054        K 4.1    Cl 106    CO2 24    Glucose 83    BUN 11    Creatinine 0.80    EGFR 95     110         Comment for EGFR at 1054 on 07/29/21: **[facility] currently reports eGFR in compliance with the current**    recommendations of the [employer]. [facility] will    update reporting as new guidelines are published from the [employer]-ASN    Task force.          Imaging  XR L elbow: simple  elbow dislocation     Physical Exam  General: alert and oriented to person place, time  Lungs: good respiratory effort, non-labored breathing  Cardio: regular rate  Abdomen: appropriate for 21 weeks   Psych: calm and cooperative     RUE  - no gross deformities  - skin intact w/o drainage, erythema, or ecchymosis  - No TTP of bony prominences of clavicle, shoulder, humerus, elbow, forearm, wrist, hand, digits  - No pain with ROM  - compartments soft and compressible  - + motor AIN/PIN/radial/ulnar/median/msk/axillary  - sensation intact radial/ulnar/median/LABC/axillary  - +2 radial pulse, hand warm and well perfused     LUE  - grossly dislocated elbow  - skin intact w/o drainage, erythema, or ecchymosis  - No TTP of bony prominences of clavicle, shoulder, humerus, forearm, wrist, hand, digits.  TTP elbow.   - painful ROM elbow   - compartments soft and compressible  - + motor AIN/PIN/radial/ulnar/median/msk/axillary  - sensation intact radial/ulnar/median/LABC/axillary  - +2 radial pulse, hand warm and well perfused    RLE  - no gross deformity  - No exposed bone, subcutaneous tissue, or foreign body identified  - No palpable effusion  - no TTP of bony prominences of pelvis, hip, femur, knee, tibia, ankle, foot, toes  - compartments soft and compressible  - able to straight leg raise, no pain with log roll  - + motor psoas/quads/hamstrings/EHL/FHL/TA/Gastrocs/Peroneals  - sensation intact sural/saphenous/DP/SP/tibial  - +2 DP pulse, foot warm and well perfused     LLE  - no gross deformity  - No exposed bone, subcutaneous tissue, or foreign body identified  - No palpable effusion  - no TTP of bony prominences of pelvis, hip, femur, knee, tibia, ankle, foot, toes  - compartments soft and compressible  - able to straight leg raise, no pain with log roll  - + motor psoas/quads/hamstrings/EHL/FHL/TA/Gastrocs/Peroneals  - sensation intact sural/saphenous/DP/SP/tibial  - +2 DP pulse, foot warm and well perfused      Procedure- L elbow closed reduction with hematoma block and fentanyl.    Written consent was obtained.  18g needle was used to enter L elbow via posterior approach.  10cc of hematoma was aspirated and discarded while leaving 18g needle in joint, 15cc 1% lidocaine was injected for hematoma block.  25mcg fentanyl was administered.  The L elbow was then closed reduced and confirmed with XR.  The elbow was taken through full ROM afterwards.  It felt stable except for a little laxity in full extension and some gapping with valgus stress. The LUE was then placed into a posterior splint with side struts.  Post splinting XR confirmed position once again.  The patient tolerated the procedure well.  She remained NVI intact afterwards.     Assessment   37 y.o. female being consulted for L elbow simple dislocation without obvious fractures.  The elbow was closed reduced and placed into a splint.  There is a chance this will not need surgery.  She should maintain splint for 7-10 days and follow up with Dr Black in the office then to remove splint, repeat XR and to undergo another ROM exam.  If elbow remains stable, she can pursue non operative management.  If it appears unstable, she may need soft tissue repair.  This cn be discussed at F/U visit.      Plan     - NWB LUE in splint.  She may ROM her wirst, fingers and shoulder  - Sling for comfort   - pain control  - f/u in 7-10 days with Dr. Black   - discussed signs and symptoms of compartment syndrome.  Return if there is concern.    - Orthopaedically stable for DC    Oleksandr Lopez DO  Orthopedic Surgery PGY-2   *[phone]

## 2022-05-25 ENCOUNTER — OFFICE VISIT (OUTPATIENT)
Dept: ORTHOPEDICS | Facility: CLINIC | Age: 37
End: 2022-05-25
Payer: COMMERCIAL

## 2022-05-25 DIAGNOSIS — S53.105A ELBOW DISLOCATION, LEFT, INITIAL ENCOUNTER: Primary | ICD-10-CM

## 2022-05-25 DIAGNOSIS — S53.115A CLOSED ANTERIOR DISLOCATION OF LEFT ELBOW, INITIAL ENCOUNTER: Primary | ICD-10-CM

## 2022-05-25 PROCEDURE — 99203 OFFICE O/P NEW LOW 30 MIN: CPT | Performed by: ORTHOPAEDIC SURGERY

## 2022-05-25 NOTE — PROGRESS NOTES
[facility]    Chief Complaint: Left elbow pain    HPI: Robert Palacio is a 37-year-old female who presents today with a chief complaint of left elbow pain.  She states that on 5/15/2022 she was in her kitchen and she tripped.  She fell on an outstretched left upper extremity.  She immediately felt her left elbow dislocate.  She  presented to [facility] emergency room where x-rays were taken showing a dislocation of her left elbow joint without any apparent fracture.  She was closed reduced in the emergency room.  She has been in a splint since.  She has pain throughout her left elbow.  She denies any numbness or tingling.  She denies any difficulty moving her wrist or fingers.    Medical History: No past medical history on file.    Surgical History:   Past Surgical History:   Procedure Laterality Date   • NO PAST SURGERIES         Social History:   Social History     Social History Narrative   • Not on file       Family History:   Family History   Problem Relation Age of Onset   • Adrenal disorder Biological Father    • Diabetes Biological Father    • Hypertension Biological Father    • Diabetes Biological Mother    • Hypertension Biological Mother        Allergies: Penicillins    No current facility-administered medications for this visit.        Medication List          Accurate as of May 25, 2022  1:19 PM. If you have any questions, ask your nurse or doctor.            CONTINUE taking these medications    drospirenone-ethinyl estradioL 3-0.03 mg per tablet  Commonly known as: SUNITA SANTOROA  Take 1 tablet by mouth once daily.  Dose: 1 tablet     oxyCODONE-acetaminophen 5-325 mg per tablet  Commonly known as: PERCOCET  Take 1 tablet by mouth every 6 (six) hours as needed for moderate pain for up to 8 doses.  Dose: 1 tablet            Review of Systems: Review of systems negative except as otherwise stated in HPI    Physical Exam:   Upon physical examination, the patient is well developed, well  nourished, alert and oriented times three, with normal mood and affect and walks with a normal gait.    Left Elbow: Upon examination of the left elbow, the patient sits with a normal posture.  There is tenderness palpation throughout the elbow.  There is full flexion but decreased extension.  Varus and valgus stress testing were not tested today.  There is no effusion or swelling appreciated.  There is 5 out of 5 strength throughout the elbow.  Distal motor and sensory is grossly intact.  2+ radial pulse.  There is no erythema, warmth, or skin lesions present.    Right Elbow: Upon examination of the right elbow, the patient sits with a normal posture.  They are nontender palpation throughout the medial epicondyle, lateral epicondyle, olecranon.  There full range of motion in both extension and flexion which is painless.  They are stable to varus and valgus stress testing.  There is no effusion or swelling appreciated.  There is 5 out of 5 strength throughout the elbow.  Distal motor and sensory is grossly intact.  2+ radial pulse.  There is no erythema, warmth, or skin lesions present.    Imaging:   Multiple x-rays of the left elbow from 5/15/2022 were personally reviewed.  There is a left elbow dislocation with subsequent reduction.  There is no apparent fracture.    Assessment and Plan:  1.  Left elbow pain  2.  Previous simple left elbow dislocation    Discussed the patient's diagnosis and treatment options with them in detail today in the office.  Based on their history, physical exam, imaging I did explain that she did sustain a simple left elbow dislocation.  This was closed reduced in the emergency room.  We talked about this in detail and we discussed conservative management including rest, ice, anti-inflammatory medications, activity modification, physical therapy.  At this time a physical therapy prescription was given to her today..  I did remove her splint today.  I asked her to work on gentle range of  motion.  She was in agreement.  I asked her to begin physical therapy and follow-up with us in 4 weeks for reevaluation.  At her next visit we will begin physical therapy.    Ivan Grant, DO

## 2022-05-27 ENCOUNTER — TELEPHONE (OUTPATIENT)
Dept: FAMILY MEDICINE | Facility: CLINIC | Age: 37
End: 2022-05-27

## 2022-05-27 NOTE — TELEPHONE ENCOUNTER
Pt called back stating she was told it was optional for her to take the quantiferon test for her childcare form being as though she is pregnant.

## 2022-06-22 ENCOUNTER — OFFICE VISIT (OUTPATIENT)
Dept: ORTHOPEDICS | Facility: CLINIC | Age: 37
End: 2022-06-22
Payer: COMMERCIAL

## 2022-06-22 VITALS — BODY MASS INDEX: 27.46 KG/M2 | WEIGHT: 160 LBS

## 2022-06-22 DIAGNOSIS — G56.01 CARPAL TUNNEL SYNDROME ON RIGHT: ICD-10-CM

## 2022-06-22 DIAGNOSIS — S53.115A CLOSED ANTERIOR DISLOCATION OF LEFT ELBOW, INITIAL ENCOUNTER: Primary | ICD-10-CM

## 2022-06-22 PROCEDURE — 99213 OFFICE O/P EST LOW 20 MIN: CPT | Performed by: ORTHOPAEDIC SURGERY

## 2022-06-22 PROCEDURE — 3008F BODY MASS INDEX DOCD: CPT | Performed by: ORTHOPAEDIC SURGERY

## 2022-06-22 NOTE — PROGRESS NOTES
[facility]    Chief Complaint: Left elbow pain    HPI: Robert Palacio is a 37-year-old female last seen on 5/25/2022.  At that time, we asked her to begin physical therapy and she has been going to physical therapy multiple times a week now.  She reports about 60% improvement.  She believes her range of motion is nearly full in both extension and flexion, and she continues to see improvement.  She denies any feelings of instability or crepitus or grinding at the left elbow. She denies any numbness or tingling in the left upper extremity, but does note some numbness and tingling beginning in her right hand.  She reports this began a few weeks ago, and is most severe in her third and fourth fingers she denies any neck pain, denies any injury, denies any weakness.    Medical History: No past medical history on file.    Surgical History:   Past Surgical History:   Procedure Laterality Date   • NO PAST SURGERIES         Social History:   Social History     Social History Narrative   • Not on file       Family History:   Family History   Problem Relation Age of Onset   • Adrenal disorder Biological Father    • Diabetes Biological Father    • Hypertension Biological Father    • Diabetes Biological Mother    • Hypertension Biological Mother        Allergies: Penicillins    No current facility-administered medications for this visit.        Medication List          Accurate as of June 22, 2022  3:08 PM. If you have any questions, ask your nurse or doctor.            CONTINUE taking these medications    drospirenone-ethinyl estradioL 3-0.03 mg per tablet  Commonly known as: MAUDE,OCELLA  Take 1 tablet by mouth once daily.  Dose: 1 tablet     oxyCODONE-acetaminophen 5-325 mg per tablet  Commonly known as: PERCOCET  Take 1 tablet by mouth every 6 (six) hours as needed for moderate pain for up to 8 doses.  Dose: 1 tablet            Review of Systems: Review of systems negative except as otherwise stated  in HPI    Physical Exam:   Upon physical examination, the patient is well developed, well nourished, alert and oriented times three, with normal mood and affect and walks with a normal gait.    Left Elbow: Upon examination of the left elbow, the patient sits with a normal posture.  There is tenderness palpation throughout the elbow.  There is full flexion but decreased extension.  Varus and valgus stress testing were not tested today.  There is no effusion or swelling appreciated.  There is 5 out of 5 strength throughout the elbow.  Distal motor and sensory is grossly intact.  2+ radial pulse.  There is no erythema, warmth, or skin lesions present.    Right Elbow: Upon examination of the right elbow, the patient sits with a normal posture.  They are nontender palpation throughout the medial epicondyle, lateral epicondyle, olecranon.  There full range of motion in both extension and flexion which is painless.  They are stable to varus and valgus stress testing.  There is no effusion or swelling appreciated.  There is 5 out of 5 strength throughout the elbow.  Distal motor and sensory is grossly intact.  2+ radial pulse.  There is no erythema, warmth, or skin lesions present.    Imaging:   Multiple x-rays of the left elbow from 5/15/2022 were personally reviewed.  There is a left elbow dislocation with subsequent reduction.  There is no apparent fracture.    Assessment and Plan:  1.  Left elbow pain  2.  Previous simple left elbow dislocation  3.  Suspected carpal tunnel, right hand    Discussed the patient's continued rehab protocol.  She continues to see improvement in physical therapy suicide continue physical therapy.  Additionally, due to the numbness and tingling beginning in her right hand, we added the diagnosis of carpal tunnel to her physical therapy prescription so they will address this as well.  We asked her to continue physical therapy and to follow-up as needed if her elbow becomes painful, or if the  numbness tingling in her right hand does not improve.  She was in agreement understanding the treatment plan and we will see her back on an as-needed basis.    Sofía Liang PA-C

## 2022-06-22 NOTE — PROGRESS NOTES
[facility]    Chief Complaint: Left elbow pain    HPI: Robert Palacio is a 37-year-old female who presents today with a chief complaint of left elbow pain.  She was last seen on 5/25/2022.  At that time we discussed her left elbow dislocation.  We elected to treat this conservatively with physical therapy.  She has been in physical therapy.  She states that it is going well.  Her pain is well controlled.  She has noted improvement in her range of motion.  She denies any feelings of instability.    Medical History: No past medical history on file.    Surgical History:   Past Surgical History:   Procedure Laterality Date   • NO PAST SURGERIES         Social History:   Social History     Social History Narrative   • Not on file       Family History:   Family History   Problem Relation Age of Onset   • Adrenal disorder Biological Father    • Diabetes Biological Father    • Hypertension Biological Father    • Diabetes Biological Mother    • Hypertension Biological Mother        Allergies: Penicillins    No current facility-administered medications for this visit.        Medication List          Accurate as of June 22, 2022  3:19 PM. If you have any questions, ask your nurse or doctor.            CONTINUE taking these medications    drospirenone-ethinyl estradioL 3-0.03 mg per tablet  Commonly known as: MAUDE,OCELLA  Take 1 tablet by mouth once daily.  Dose: 1 tablet     oxyCODONE-acetaminophen 5-325 mg per tablet  Commonly known as: PERCOCET  Take 1 tablet by mouth every 6 (six) hours as needed for moderate pain for up to 8 doses.  Dose: 1 tablet            Review of Systems: Review of systems negative except as otherwise stated in HPI    Physical Exam:   Upon physical examination, the patient is well developed, well nourished, alert and oriented times three, with normal mood and affect and walks with a normal gait.    Left Elbow: Upon examination of the left elbow, the patient sits with a normal  posture.  There is minimal tenderness palpation throughout the elbow.  There is full flexion and near full extension.  Varus and valgus stress testing is stable.  There is no effusion or swelling appreciated.  There is 5 out of 5 strength throughout the elbow.  Distal motor and sensory is grossly intact.  2+ radial pulse.  There is no erythema, warmth, or skin lesions present.    Right Elbow: Upon examination of the right elbow, the patient sits with a normal posture.  They are nontender palpation throughout the medial epicondyle, lateral epicondyle, olecranon.  There full range of motion in both extension and flexion which is painless.  They are stable to varus and valgus stress testing.  There is no effusion or swelling appreciated.  There is 5 out of 5 strength throughout the elbow.  Distal motor and sensory is grossly intact.  2+ radial pulse.  There is no erythema, warmth, or skin lesions present.    Assessment and Plan:  1.  Left elbow pain  2.  Previous simple left elbow dislocation    Discussed the patient's diagnosis and treatment options with them in detail today in the office.  We discussed the patient's previous left elbow dislocation.  She has been doing very well treating this conservatively with physical therapy.  She has regained near full range of motion, lacking only a few degrees of extension.  She denies any feelings of clicking or popping and she denies any crepitus.  At this time I recommended continuing physical therapy asked her to follow-up with us in 3 months if she has persistent or worsening pain.    Ivan Grant, DO

## 2022-07-06 ENCOUNTER — RX ONLY (OUTPATIENT)
Age: 37
Setting detail: RX ONLY
End: 2022-07-06

## 2022-07-06 ENCOUNTER — APPOINTMENT (RX ONLY)
Dept: URBAN - METROPOLITAN AREA CLINIC 28 | Facility: CLINIC | Age: 37
Setting detail: DERMATOLOGY
End: 2022-07-06

## 2022-07-06 DIAGNOSIS — L70.0 ACNE VULGARIS: ICD-10-CM | Status: IMPROVED

## 2022-07-06 PROCEDURE — 99213 OFFICE O/P EST LOW 20 MIN: CPT

## 2022-07-06 PROCEDURE — ? TREATMENT REGIMEN

## 2022-07-06 PROCEDURE — ? PHOTO-DOCUMENTATION

## 2022-07-06 PROCEDURE — ? ADDITIONAL NOTES

## 2022-07-06 PROCEDURE — ? EXTRACTIONS

## 2022-07-06 PROCEDURE — ? COUNSELING

## 2022-07-06 PROCEDURE — ? PRESCRIPTION MEDICATION MANAGEMENT

## 2022-07-06 RX ORDER — SODIUM SULFACETAMIDE AND SULFUR 80; 40 MG/ML; MG/ML
LOTION TOPICAL
Qty: 473 | Refills: 5 | Status: ERX

## 2022-07-06 RX ORDER — CLINDAMYCIN PHOSPHATE 10 MG/ML
SOLUTION TOPICAL QAM
Qty: 60 | Refills: 3 | Status: ERX

## 2022-07-06 ASSESSMENT — LOCATION SIMPLE DESCRIPTION DERM
LOCATION SIMPLE: RIGHT CHEEK
LOCATION SIMPLE: LEFT CHEEK

## 2022-07-06 ASSESSMENT — LOCATION DETAILED DESCRIPTION DERM
LOCATION DETAILED: LEFT CENTRAL MALAR CHEEK
LOCATION DETAILED: RIGHT INFERIOR CENTRAL MALAR CHEEK

## 2022-07-06 ASSESSMENT — LOCATION ZONE DERM: LOCATION ZONE: FACE

## 2022-07-06 NOTE — PROCEDURE: COUNSELING
Doxycycline Pregnancy And Lactation Text: This medication is Pregnancy Category D and not consider safe during pregnancy. It is also excreted in breast milk but is considered safe for shorter treatment courses.
Azithromycin Counseling:  I discussed with the patient the risks of azithromycin including but not limited to GI upset, allergic reaction, drug rash, diarrhea, and yeast infections.
Tetracycline Pregnancy And Lactation Text: This medication is Pregnancy Category D and not consider safe during pregnancy. It is also excreted in breast milk.
Tetracycline Counseling: Patient counseled regarding possible photosensitivity and increased risk for sunburn.  Patient instructed to avoid sunlight, if possible.  When exposed to sunlight, patients should wear protective clothing, sunglasses, and sunscreen.  The patient was instructed to call the office immediately if the following severe adverse effects occur:  hearing changes, easy bruising/bleeding, severe headache, or vision changes.  The patient verbalized understanding of the proper use and possible adverse effects of tetracycline.  All of the patient's questions and concerns were addressed. Patient understands to avoid pregnancy while on therapy due to potential birth defects.
Include Pregnancy/Lactation Warning?: No
Isotretinoin Pregnancy And Lactation Text: This medication is Pregnancy Category X and is considered extremely dangerous during pregnancy. It is unknown if it is excreted in breast milk.
Topical Retinoid Pregnancy And Lactation Text: This medication is Pregnancy Category C. It is unknown if this medication is excreted in breast milk.
Sarecycline Counseling: Patient advised regarding possible photosensitivity and discoloration of the teeth, skin, lips, tongue and gums.  Patient instructed to avoid sunlight, if possible.  When exposed to sunlight, patients should wear protective clothing, sunglasses, and sunscreen.  The patient was instructed to call the office immediately if the following severe adverse effects occur:  hearing changes, easy bruising/bleeding, severe headache, or vision changes.  The patient verbalized understanding of the proper use and possible adverse effects of sarecycline.  All of the patient's questions and concerns were addressed.
Topical Sulfur Applications Counseling: Topical Sulfur Counseling: Patient counseled that this medication may cause skin irritation or allergic reactions.  In the event of skin irritation, the patient was advised to reduce the amount of the drug applied or use it less frequently.   The patient verbalized understanding of the proper use and possible adverse effects of topical sulfur application.  All of the patient's questions and concerns were addressed.
Azithromycin Pregnancy And Lactation Text: This medication is considered safe during pregnancy and is also secreted in breast milk.
Tazorac Counseling:  Patient advised that medication is irritating and drying.  Patient may need to apply sparingly and wash off after an hour before eventually leaving it on overnight.  The patient verbalized understanding of the proper use and possible adverse effects of tazorac.  All of the patient's questions and concerns were addressed.
Benzoyl Peroxide Counseling: Patient counseled that medicine may cause skin irritation and bleach clothing.  In the event of skin irritation, the patient was advised to reduce the amount of the drug applied or use it less frequently.   The patient verbalized understanding of the proper use and possible adverse effects of benzoyl peroxide.  All of the patient's questions and concerns were addressed.
High Dose Vitamin A Pregnancy And Lactation Text: High dose vitamin A therapy is contraindicated during pregnancy and breast feeding.
High Dose Vitamin A Counseling: Side effects reviewed, pt to contact office should one occur.
Birth Control Pills Pregnancy And Lactation Text: This medication should be avoided if pregnant and for the first 30 days post-partum.
Topical Sulfur Applications Pregnancy And Lactation Text: This medication is Pregnancy Category C and has an unknown safety profile during pregnancy. It is unknown if this topical medication is excreted in breast milk.
Erythromycin Counseling:  I discussed with the patient the risks of erythromycin including but not limited to GI upset, allergic reaction, drug rash, diarrhea, increase in liver enzymes, and yeast infections.
Erythromycin Pregnancy And Lactation Text: This medication is Pregnancy Category B and is considered safe during pregnancy. It is also excreted in breast milk.
Tazorac Pregnancy And Lactation Text: This medication is not safe during pregnancy. It is unknown if this medication is excreted in breast milk.
Minocycline Counseling: Patient advised regarding possible photosensitivity and discoloration of the teeth, skin, lips, tongue and gums.  Patient instructed to avoid sunlight, if possible.  When exposed to sunlight, patients should wear protective clothing, sunglasses, and sunscreen.  The patient was instructed to call the office immediately if the following severe adverse effects occur:  hearing changes, easy bruising/bleeding, severe headache, or vision changes.  The patient verbalized understanding of the proper use and possible adverse effects of minocycline.  All of the patient's questions and concerns were addressed.
Dapsone Pregnancy And Lactation Text: This medication is Pregnancy Category C and is not considered safe during pregnancy or breast feeding.
Dapsone Counseling: I discussed with the patient the risks of dapsone including but not limited to hemolytic anemia, agranulocytosis, rashes, methemoglobinemia, kidney failure, peripheral neuropathy, headaches, GI upset, and liver toxicity.  Patients who start dapsone require monitoring including baseline LFTs and weekly CBCs for the first month, then every month thereafter.  The patient verbalized understanding of the proper use and possible adverse effects of dapsone.  All of the patient's questions and concerns were addressed.
Benzoyl Peroxide Pregnancy And Lactation Text: This medication is Pregnancy Category C. It is unknown if benzoyl peroxide is excreted in breast milk.
Bactrim Counseling:  I discussed with the patient the risks of sulfa antibiotics including but not limited to GI upset, allergic reaction, drug rash, diarrhea, dizziness, photosensitivity, and yeast infections.  Rarely, more serious reactions can occur including but not limited to aplastic anemia, agranulocytosis, methemoglobinemia, blood dyscrasias, liver or kidney failure, lung infiltrates or desquamative/blistering drug rashes.
Spironolactone Counseling: Patient advised regarding risks of diarrhea, abdominal pain, hyperkalemia, birth defects (for female patients), liver toxicity and renal toxicity. The patient may need blood work to monitor liver and kidney function and potassium levels while on therapy. The patient verbalized understanding of the proper use and possible adverse effects of spironolactone.  All of the patient's questions and concerns were addressed.
Detail Level: Zone
Doxycycline Counseling:  Patient counseled regarding possible photosensitivity and increased risk for sunburn.  Patient instructed to avoid sunlight, if possible.  When exposed to sunlight, patients should wear protective clothing, sunglasses, and sunscreen.  The patient was instructed to call the office immediately if the following severe adverse effects occur:  hearing changes, easy bruising/bleeding, severe headache, or vision changes.  The patient verbalized understanding of the proper use and possible adverse effects of doxycycline.  All of the patient's questions and concerns were addressed.
Topical Clindamycin Counseling: Patient counseled that this medication may cause skin irritation or allergic reactions.  In the event of skin irritation, the patient was advised to reduce the amount of the drug applied or use it less frequently.   The patient verbalized understanding of the proper use and possible adverse effects of clindamycin.  All of the patient's questions and concerns were addressed.
Birth Control Pills Counseling: Birth Control Pill Counseling: I discussed with the patient the potential side effects of OCPs including but not limited to increased risk of stroke, heart attack, thrombophlebitis, deep venous thrombosis, hepatic adenomas, breast changes, GI upset, headaches, and depression.  The patient verbalized understanding of the proper use and possible adverse effects of OCPs. All of the patient's questions and concerns were addressed.
Topical Retinoid counseling:  Patient advised to apply a pea-sized amount only at bedtime and wait 30 minutes after washing their face before applying.  If too drying, patient may add a non-comedogenic moisturizer. The patient verbalized understanding of the proper use and possible adverse effects of retinoids.  All of the patient's questions and concerns were addressed.
Bactrim Pregnancy And Lactation Text: This medication is Pregnancy Category D and is known to cause fetal risk.  It is also excreted in breast milk.
Spironolactone Pregnancy And Lactation Text: This medication can cause feminization of the male fetus and should be avoided during pregnancy. The active metabolite is also found in breast milk.
Topical Clindamycin Pregnancy And Lactation Text: This medication is Pregnancy Category B and is considered safe during pregnancy. It is unknown if it is excreted in breast milk.
Isotretinoin Counseling: Patient should get monthly blood tests, not donate blood, not drive at night if vision affected, not share medication, and not undergo elective surgery for 6 months after tx completed. Side effects reviewed, pt to contact office should one occur.
Azelaic Acid Counseling: Patient counseled that medicine may cause skin irritation and to avoid applying near the eyes.  In the event of skin irritation, the patient was advised to reduce the amount of the drug applied or use it less frequently.   The patient verbalized understanding of the proper use and possible adverse effects of azelaic acid.  All of the patient's questions and concerns were addressed.
Winlevi Counseling:  I discussed with the patient the risks of topical clascoterone including but not limited to erythema, scaling, itching, and stinging. Patient voiced their understanding.
Azelaic Acid Pregnancy And Lactation Text: This medication is considered safe during pregnancy and breast feeding.
Winlevi Pregnancy And Lactation Text: This medication is considered safe during pregnancy and breastfeeding.
Aklief counseling:  Patient advised to apply a pea-sized amount only at bedtime and wait 30 minutes after washing their face before applying.  If too drying, patient may add a non-comedogenic moisturizer.  The most commonly reported side effects including irritation, redness, scaling, dryness, stinging, burning, itching, and increased risk of sunburn.  The patient verbalized understanding of the proper use and possible adverse effects of retinoids.  All of the patient's questions and concerns were addressed.
Aklief Pregnancy And Lactation Text: It is unknown if this medication is safe to use during pregnancy.  It is unknown if this medication is excreted in breast milk.  Breastfeeding women should use the topical cream on the smallest area of the skin for the shortest time needed while breastfeeding.  Do not apply to nipple and areola.

## 2022-07-06 NOTE — PROCEDURE: EXTRACTIONS
Acne Type: Comedonal Lesions
Prep Text (Optional): Prior to removal the treatment areas were prepped in the usual fashion.
Total Number Of Lesions Treated: 20
Consent was obtained and risks were reviewed including but not limited to scarring, infection, bleeding, scabbing, incomplete removal, and allergy to anesthesia.
Render Post-Care Instructions In Note?: no
Extraction Method: comedo extractor
Post-Care Instructions: I reviewed with the patient in detail post-care instructions. Patient is to keep the treatment areas dry overnight, and then apply bacitracin twice daily until healed. Patient may apply hydrogen peroxide soaks to remove any crusting.
Detail Level: Zone

## 2022-07-06 NOTE — PROCEDURE: PRESCRIPTION MEDICATION MANAGEMENT
Continue Regimen: benzoyl peroxide 10% topical cleanser: Wash face and chest Qam in the shower, let sit for 3-5 minutes then rinse off\\nclindamycin phosphate 1% topical swab: Wipe QAM to the acne of the face and body after using BPO Wash\\nSulfaCleanse 8-4 8%-4% topical suspension: Wash face QHS\\nFinacea 15% topical foam: Apply a thin layer to face QHS after washing face
Detail Level: Zone
Render In Strict Bullet Format?: No

## 2022-07-06 NOTE — PROCEDURE: ADDITIONAL NOTES
Additional Notes: Patient is currently 29 weeks pregnant with twins.
Render Risk Assessment In Note?: no
Detail Level: Zone

## 2022-07-18 ENCOUNTER — RX ONLY (OUTPATIENT)
Age: 37
Setting detail: RX ONLY
End: 2022-07-18

## 2022-07-18 RX ORDER — BENZOYL PEROXIDE 100 MG/G
LOTION TOPICAL QAM
Qty: 237 | Refills: 3 | Status: ERX

## 2022-08-12 NOTE — PROCEDURE: MEDICATION COUNSELING
Called pt about his labs  a1c 11    Aware he can't be cleared for orthopedic surgery with his numbers and he is aware (since he stopped his meds on his own due to intolerance issues and hypoglycemia episodes)    He was advised to make appt for DM f/u or ask for referral to endocrinologist  He will think about it  Acitretin Pregnancy And Lactation Text: This medication is Pregnancy Category X and should not be given to women who are pregnant or may become pregnant in the future. This medication is excreted in breast milk.

## 2022-08-29 ENCOUNTER — PATIENT OUTREACH (OUTPATIENT)
Dept: CASE MANAGEMENT | Facility: CLINIC | Age: 37
End: 2022-08-29
Payer: COMMERCIAL

## 2022-08-29 NOTE — PROGRESS NOTES
SUSY #1 Ambulatory Care Manager phoned patient for initial SUSY call.  Message left, informed of Care Manager role, provided contact information, requested call back.  Will continue to follow for SUSY.     Wade Khan, BSN, RN  Ambulatory Care Manager  [phone]

## 2022-08-30 NOTE — PROGRESS NOTES
Outreach created in error.  No TCM indicated at this time.      Wade Khan, BSN, RN  Ambulatory Care Manager  [phone]

## 2022-10-27 ENCOUNTER — OFFICE VISIT (OUTPATIENT)
Dept: FAMILY MEDICINE | Facility: CLINIC | Age: 37
End: 2022-10-27
Payer: COMMERCIAL

## 2022-10-27 VITALS
OXYGEN SATURATION: 97 % | BODY MASS INDEX: 24.59 KG/M2 | HEIGHT: 65 IN | HEART RATE: 84 BPM | TEMPERATURE: 97.3 F | DIASTOLIC BLOOD PRESSURE: 76 MMHG | SYSTOLIC BLOOD PRESSURE: 124 MMHG | WEIGHT: 147.6 LBS

## 2022-10-27 DIAGNOSIS — F32.A DEPRESSION, UNSPECIFIED DEPRESSION TYPE: ICD-10-CM

## 2022-10-27 DIAGNOSIS — F32.1 CURRENT MODERATE EPISODE OF MAJOR DEPRESSIVE DISORDER WITHOUT PRIOR EPISODE (CMS/HCC): Primary | ICD-10-CM

## 2022-10-27 PROCEDURE — 3008F BODY MASS INDEX DOCD: CPT | Performed by: FAMILY MEDICINE

## 2022-10-27 PROCEDURE — 99214 OFFICE O/P EST MOD 30 MIN: CPT | Performed by: FAMILY MEDICINE

## 2022-10-27 RX ORDER — SERTRALINE HYDROCHLORIDE 50 MG/1
50 TABLET, FILM COATED ORAL DAILY
Qty: 90 TABLET | Refills: 3 | Status: SHIPPED | OUTPATIENT
Start: 2022-10-27 | End: 2023-10-27

## 2022-10-27 RX ORDER — SERTRALINE HYDROCHLORIDE 25 MG/1
25 TABLET, FILM COATED ORAL
COMMUNITY
Start: 2022-10-04 | End: 2022-10-27 | Stop reason: DRUGHIGH

## 2022-10-27 RX ORDER — FLUTICASONE PROPIONATE 50 MCG
1 SPRAY, SUSPENSION (ML) NASAL DAILY
Qty: 16 G | Refills: 5 | Status: SHIPPED | OUTPATIENT
Start: 2022-10-27 | End: 2023-05-17 | Stop reason: SDUPTHER

## 2022-10-27 ASSESSMENT — ENCOUNTER SYMPTOMS
CHILLS: 0
WHEEZING: 0
DIZZINESS: 0
COLOR CHANGE: 0
HEADACHES: 0
NAUSEA: 0
FEVER: 0
PALPITATIONS: 0
ARTHRALGIAS: 0
AGITATION: 1
HEMATURIA: 0
VOMITING: 0
SHORTNESS OF BREATH: 0
SORE THROAT: 0
NERVOUS/ANXIOUS: 0
APPETITE CHANGE: 1
EYE REDNESS: 0
CONSTIPATION: 0
DIFFICULTY URINATING: 0
ADENOPATHY: 0
DIARRHEA: 0
COUGH: 0
JOINT SWELLING: 0
CHEST TIGHTNESS: 0
ABDOMINAL PAIN: 0
BLOOD IN STOOL: 0
TROUBLE SWALLOWING: 0

## 2022-10-27 NOTE — LETTER
October 27, 2022     Patient: Robert Palacio  YOB: 1985  Date of Visit: 10/27/2022    To Whom it May Concern:    Robert Palacio was seen in my clinic on 10/27/2022 at 12:15 pm. Please excuse Robert for her absence from work from 10/27 through 11/30/22. She is being referred for further evaluation to assess her ability to resume her work related duties.     If you have any questions or concerns, please don't hesitate to call.         Sincerely,         Antony Rocha, DO        CC: No Recipients

## 2022-10-27 NOTE — PROGRESS NOTES
[facility]     HISTORY OF PRESENT ILLNESS      Patient experiencing ongoing depression. Depression developed post-partum and was initially treated by her OB about a month ago with sertraline 25 mg daily. Since that time, the patient's father has passed away. She is currently OOW from post-partum depression and is caring for her twins and her mother. She is struggling to perform her responsibilities as she is the only provider. Her appetite has declined and her sleep is poor. She is seeking medical attention and support to get through this difficult time.      PAST MEDICAL AND SURGICAL HISTORY      No past medical history on file.    Past Surgical History:   Procedure Laterality Date    NO PAST SURGERIES         MEDICATIONS        Current Outpatient Medications:     fluticasone propionate (FLONASE) 50 mcg/actuation nasal spray, Administer 1 spray into each nostril daily., Disp: 16 g, Rfl: 5    sertraline (ZOLOFT) 50 mg tablet, Take 1 tablet (50 mg total) by mouth daily., Disp: 90 tablet, Rfl: 3    drospirenone-ethinyl estradiol (MAUDE,OCELLA) 3-0.03 mg per tablet, Take 1 tablet by mouth once daily., Disp: , Rfl: 1    ALLERGIES      Penicillins    FAMILY HISTORY      Family History   Problem Relation Age of Onset    Adrenal disorder Biological Father     Diabetes Biological Father     Hypertension Biological Father     Diabetes Biological Mother     Hypertension Biological Mother        SOCIAL HISTORY      Social History     Socioeconomic History    Marital status: Single     Spouse name: None    Number of children: None    Years of education: None    Highest education level: None   Occupational History    Occupation: Teacher    Tobacco Use    Smoking status: Never    Smokeless tobacco: Never   Substance and Sexual Activity    Alcohol use: Yes     Comment: social     Drug use: No    Sexual activity: Yes     Partners: Male     Birth control/protection: None     Social Determinants  "of Health     Food Insecurity: No Food Insecurity    Worried About Running Out of Food in the Last Year: Never true    Ran Out of Food in the Last Year: Never true       REVIEW OF SYSTEMS      Review of Systems   Constitutional: Positive for appetite change (decreased). Negative for chills and fever. Unexpected weight change: weight loss due to stress.   HENT: Negative for congestion, ear pain, sore throat and trouble swallowing.    Eyes: Negative for redness and visual disturbance.   Respiratory: Negative for cough, chest tightness, shortness of breath and wheezing.    Cardiovascular: Negative for chest pain, palpitations and leg swelling.   Gastrointestinal: Negative for abdominal pain, blood in stool, constipation, diarrhea, nausea and vomiting.   Genitourinary: Negative for difficulty urinating and hematuria.   Musculoskeletal: Negative for arthralgias and joint swelling.   Skin: Negative for color change and rash.   Neurological: Negative for dizziness and headaches.   Hematological: Negative for adenopathy.   Psychiatric/Behavioral: Positive for agitation. The patient is not nervous/anxious.         Depression       PHYSICAL EXAMINATION      Vitals:    10/27/22 1226   BP: 124/76   Pulse: 84   Temp: 36.3 °C (97.3 °F)   SpO2: 97%   Weight: 67 kg (147 lb 9.6 oz)   Height: 1.651 m (5' 5\")     Body mass index is 24.56 kg/m².    Physical Exam  Constitutional:       Appearance: Normal appearance. She is well-developed.   Cardiovascular:      Rate and Rhythm: Normal rate and regular rhythm.      Heart sounds: Normal heart sounds.   Pulmonary:      Effort: Pulmonary effort is normal.      Breath sounds: Normal breath sounds.   Skin:     General: Skin is warm and dry.   Neurological:      Mental Status: She is alert and oriented to person, place, and time.   Psychiatric:         Behavior: Behavior normal.            ASSESSMENT AND PLAN   Assessment/Plan   Problem List Items Addressed This Visit        Mental Health    " Current moderate episode of major depressive disorder without prior episode (CMS/[facility]) - Primary     Currently your depression is not well controlled on the prescribed treatment regimen. Avoid lifestyle activities that can worsen depression such as ETOH or illicit drug usage. Continue to receive therapy/counseling through health insurance plan, local treatment center, albert-based organization or employer based program. Take all prescribed medications as directed. Report any side effects if they develop. Return to the office with any lack of improvement, worsening of the depression or as directed for routine follow up.     If you have thoughts of suicide or harming yourself or others, immediate call your local emergency number (such as 911) or go to the hospital emergency room. You may also call a suicide hotline from anywhere in the [place], 24 hours a day, 7 days a week: 6-296-XHOQIRT or 1-130.905.6831.    Orders This Visit: Adjust medication: titrate zoloft to 50 mg  Referral issued: behavioral health for therapy and psychiatry for evaluation  Patient's OB initiated zoloft for post-partum depression about 1 month ago at 25 mg daily             Relevant Medications    sertraline (ZOLOFT) 50 mg tablet     .We have agreed to increase medication, seek counseling and refer to psych. She is currently OOW and I have written a supporting note to extend her work absence to 11/30. At that time, she should be under the care of a psychiatrist who can better assess her capacity to return to work.    Antony Rocha, DO  10/27/22  2:58 PM

## 2022-10-27 NOTE — ASSESSMENT & PLAN NOTE
Currently your depression is not well controlled on the prescribed treatment regimen. Avoid lifestyle activities that can worsen depression such as ETOH or illicit drug usage. Continue to receive therapy/counseling through health insurance plan, local treatment center, albert-based organization or employer based program. Take all prescribed medications as directed. Report any side effects if they develop. Return to the office with any lack of improvement, worsening of the depression or as directed for routine follow up.     If you have thoughts of suicide or harming yourself or others, immediate call your local emergency number (such as 911) or go to the hospital emergency room. You may also call a suicide hotline from anywhere in the [place], 24 hours a day, 7 days a week: 3-179-BGDRXBS or 1-386.906.3046.    Orders This Visit: Adjust medication: titrate zoloft to 50 mg  Referral issued: behavioral health for therapy and psychiatry for evaluation  Patient's OB initiated zoloft for post-partum depression about 1 month ago at 25 mg daily

## 2022-12-07 ENCOUNTER — TELEMEDICINE (OUTPATIENT)
Dept: FAMILY MEDICINE | Facility: CLINIC | Age: 37
End: 2022-12-07
Payer: COMMERCIAL

## 2022-12-07 VITALS — HEIGHT: 64 IN | BODY MASS INDEX: 25.34 KG/M2

## 2022-12-07 DIAGNOSIS — F32.1 CURRENT MODERATE EPISODE OF MAJOR DEPRESSIVE DISORDER WITHOUT PRIOR EPISODE (CMS/HCC): Primary | ICD-10-CM

## 2022-12-07 PROCEDURE — 99213 OFFICE O/P EST LOW 20 MIN: CPT | Mod: 95 | Performed by: FAMILY MEDICINE

## 2022-12-07 PROCEDURE — 3008F BODY MASS INDEX DOCD: CPT | Performed by: FAMILY MEDICINE

## 2022-12-07 ASSESSMENT — ENCOUNTER SYMPTOMS
TROUBLE SWALLOWING: 0
DIZZINESS: 0
SHORTNESS OF BREATH: 0
BLOOD IN STOOL: 0
SORE THROAT: 0
WHEEZING: 0
ADENOPATHY: 0
DIARRHEA: 0
HEMATURIA: 0
VOMITING: 0
ARTHRALGIAS: 0
CHILLS: 0
JOINT SWELLING: 0
NAUSEA: 0
CONSTIPATION: 0
DIFFICULTY URINATING: 0
FEVER: 0
NERVOUS/ANXIOUS: 0
COUGH: 0
PALPITATIONS: 0
UNEXPECTED WEIGHT CHANGE: 0
CHEST TIGHTNESS: 0
ABDOMINAL PAIN: 0
EYE REDNESS: 0
COLOR CHANGE: 0
HEADACHES: 0

## 2022-12-07 NOTE — PROGRESS NOTES
Verification of Patient Location:  The patient affirms they are currently located in the following state: [place]    Request for Consent:    Audio and Video Encounter   Hello, my name is Antony Rocha DO.  Before we proceed, can you please verify your identification by telling me your full name and date of birth?  Can you tell me who is in the room with you?    You and I are about to have a telemedicine check-in or visit because you have requested it.  This is a live video-conference.  I am a real person, speaking to you in real time.  There is no one else with me on the video-conference. I am not recording this conversation and I am asking you not to record it.  This telemedicine visit will be billed to your health insurance or you, if you are self-insured.  You understand you will be responsible for any copayments or coinsurances that apply to your telemedicine visit.  Communication platform used for this encounter:  [employer] Video Visit (Epic Video Client)       Before starting our telemedicine visit, I am required to get your consent for this virtual check-in or visit by telemedicine. Do you consent?      Patient Response to Request for Consent:  Yes      Visit Documentation:  Subjective     Patient ID: Robert Palacio is a 37 y.o. female.  1985      Patient and I conducted a telemedicine visit via video regarding follow up of anxiety and depression. Patient has been receiving behavioral counseling and continues on sertraline 50 mg daily. She has noticed some improvement but continues to struggle with performing her ADLs. She has been out of work for several weeks, has her brother assisting her with caring for her children and had her mother for additional support. She is applying for disability and will need a psychiatry consultation to determine if she is disabled. Given the limited availability of psychiatry at this time she has been placed on a waiting list. She needs me to extend her leave of  absence from work until she can be seen by psych.       The following have been reviewed and updated as appropriate in this visit:   Tobacco  Allergies  Meds  Problems       Review of Systems   Constitutional: Negative for chills, fever and unexpected weight change.   HENT: Negative for congestion, ear pain, sore throat and trouble swallowing.    Eyes: Negative for redness and visual disturbance.   Respiratory: Negative for cough, chest tightness, shortness of breath and wheezing.    Cardiovascular: Negative for chest pain, palpitations and leg swelling.   Gastrointestinal: Negative for abdominal pain, blood in stool, constipation, diarrhea, nausea and vomiting.   Genitourinary: Negative for difficulty urinating and hematuria.   Musculoskeletal: Negative for arthralgias and joint swelling.   Skin: Negative for color change and rash.   Neurological: Negative for dizziness and headaches.   Hematological: Negative for adenopathy.   Psychiatric/Behavioral: The patient is not nervous/anxious.         Anxiety, depression         Assessment/Plan   Diagnoses and all orders for this visit:    Current moderate episode of major depressive disorder without prior episode (CMS/[facility]) (Primary)  Assessment & Plan:  Major Depression Assessment: You have been diagnosed with depression. Avoid lifestyle activities that can worsen depression such as alcohol use beyond moderate levels or any illicit drug usage. Continue to participate in therapy/counseling through your health insurance plan, local treatment center, albert-based organization or employer based program. Take all prescribed medications as directed. Report any side effects if they develop. Return to the office with any lack of improvement, worsening of the depression, or as directed for routine follow up.     If you have thoughts of suicide or harming yourself or others, immediately call your local emergency number (such as 911) or go to the hospital emergency room. You may  also call a suicide hotline from anywhere in the [place], 24 hours a day, 7 days a week: 3-622-CNWBLMN or 1-391.188.7341.    TREATMENT PLAN NOTES  Depression not optimally controlled with current treatment plan  Referred to psychiatry  Currently participating in behavioral health  patient will reach out via portal in a few weeks for us to determine if dose titration is needed  -------------------------------------------  NEXT VISIT CONSIDERATIONS  Adjust medication(s): titrate sertraline to 100 mg daily            Time Spent:  I spent 15 minutes on this date of service performing the following activities: obtaining history.

## 2022-12-07 NOTE — LETTER
December 7, 2022     Patient: Robert Palacio  YOB: 1985  Date of Visit: 12/7/2022    To Whom it May Concern:    Robert Palacio and I conducted a video telemedicine visit on 12/7/2022 at 9:00 am. Please excuse Robert for her absence from work 12/1/22 through 12/31/22. Currently, Robert is being treated for major depression  and anxiety. She is receiving behavioral therapy and prescribed medication. At this time her condition precludes her return to work until she is evaluated by a psychiatrist. Psychiatry availability is limited and the patient is currently on a waiting list to be seen. I have extended her medical absence from work until 12/31/22 or until her care is transferred to psychiatry.    If you have any questions or concerns, please don't hesitate to call.         Sincerely,         Antony Rocha, DO        CC: No Recipients

## 2022-12-07 NOTE — ASSESSMENT & PLAN NOTE
Major Depression Assessment: You have been diagnosed with depression. Avoid lifestyle activities that can worsen depression such as alcohol use beyond moderate levels or any illicit drug usage. Continue to participate in therapy/counseling through your health insurance plan, local treatment center, albert-based organization or employer based program. Take all prescribed medications as directed. Report any side effects if they develop. Return to the office with any lack of improvement, worsening of the depression, or as directed for routine follow up.     If you have thoughts of suicide or harming yourself or others, immediately call your local emergency number (such as 911) or go to the hospital emergency room. You may also call a suicide hotline from anywhere in the [place], 24 hours a day, 7 days a week: 7-924-IFELAGM or 1-525.776.8795.    TREATMENT PLAN NOTES  Depression not optimally controlled with current treatment plan  Referred to psychiatry  Currently participating in behavioral health  patient will reach out via portal in a few weeks for us to determine if dose titration is needed  -------------------------------------------  NEXT VISIT CONSIDERATIONS  Adjust medication(s): titrate sertraline to 100 mg daily

## 2023-01-19 ENCOUNTER — APPOINTMENT (RX ONLY)
Dept: URBAN - METROPOLITAN AREA CLINIC 28 | Facility: CLINIC | Age: 38
Setting detail: DERMATOLOGY
End: 2023-01-19

## 2023-01-19 DIAGNOSIS — L70.0 ACNE VULGARIS: ICD-10-CM | Status: IMPROVED

## 2023-01-19 PROCEDURE — 99214 OFFICE O/P EST MOD 30 MIN: CPT

## 2023-01-19 PROCEDURE — ? COUNSELING

## 2023-01-19 PROCEDURE — ? PRESCRIPTION

## 2023-01-19 PROCEDURE — ? PRESCRIPTION MEDICATION MANAGEMENT

## 2023-01-19 RX ORDER — AZELAIC ACID 0.15 G/G
AEROSOL, FOAM TOPICAL QHS
Qty: 50 | Refills: 5 | Status: CANCELLED | COMMUNITY
Start: 2023-01-19

## 2023-01-19 RX ORDER — BENZOYL PEROXIDE 100 MG/G
LOTION TOPICAL QDAY
Qty: 237 | Refills: 6 | Status: ERX

## 2023-01-19 RX ORDER — SODIUM SULFACETAMIDE AND SULFUR 80; 40 MG/ML; MG/ML
LOTION TOPICAL QHS
Qty: 473 | Refills: 5 | Status: ERX

## 2023-01-19 RX ORDER — CLINDAMYCIN PHOSPHATE 10 MG/ML
SOLUTION TOPICAL QAM
Qty: 60 | Refills: 5 | Status: ERX

## 2023-01-19 RX ADMIN — AZELAIC ACID: 0.15 AEROSOL, FOAM TOPICAL at 00:00

## 2023-01-19 ASSESSMENT — LOCATION DETAILED DESCRIPTION DERM: LOCATION DETAILED: LEFT CENTRAL MALAR CHEEK

## 2023-01-19 ASSESSMENT — LOCATION ZONE DERM: LOCATION ZONE: FACE

## 2023-01-19 ASSESSMENT — LOCATION SIMPLE DESCRIPTION DERM: LOCATION SIMPLE: LEFT CHEEK

## 2023-01-26 ENCOUNTER — RX ONLY (OUTPATIENT)
Age: 38
Setting detail: RX ONLY
End: 2023-01-26

## 2023-01-26 RX ORDER — AZELAIC ACID 0.15 G/G
AEROSOL, FOAM TOPICAL QHS
Qty: 50 | Refills: 3 | Status: ERX

## 2023-05-17 ENCOUNTER — OFFICE VISIT (OUTPATIENT)
Dept: FAMILY MEDICINE | Facility: CLINIC | Age: 38
End: 2023-05-17
Payer: COMMERCIAL

## 2023-05-17 VITALS
BODY MASS INDEX: 24.53 KG/M2 | TEMPERATURE: 97.3 F | HEIGHT: 65 IN | DIASTOLIC BLOOD PRESSURE: 70 MMHG | SYSTOLIC BLOOD PRESSURE: 104 MMHG | HEART RATE: 84 BPM | OXYGEN SATURATION: 98 % | WEIGHT: 147.2 LBS

## 2023-05-17 DIAGNOSIS — F32.1 CURRENT MODERATE EPISODE OF MAJOR DEPRESSIVE DISORDER WITHOUT PRIOR EPISODE (CMS/HCC): ICD-10-CM

## 2023-05-17 DIAGNOSIS — Z00.00 ROUTINE MEDICAL EXAM: Primary | ICD-10-CM

## 2023-05-17 DIAGNOSIS — A60.00 GENITAL HERPES SIMPLEX, UNSPECIFIED SITE: ICD-10-CM

## 2023-05-17 DIAGNOSIS — J30.1 ALLERGIC RHINITIS DUE TO POLLEN, UNSPECIFIED SEASONALITY: ICD-10-CM

## 2023-05-17 PROBLEM — S53.115A: Status: RESOLVED | Noted: 2022-05-25 | Resolved: 2023-05-17

## 2023-05-17 PROCEDURE — 3008F BODY MASS INDEX DOCD: CPT | Performed by: FAMILY MEDICINE

## 2023-05-17 PROCEDURE — 99395 PREV VISIT EST AGE 18-39: CPT | Performed by: FAMILY MEDICINE

## 2023-05-17 RX ORDER — FLUTICASONE PROPIONATE 50 MCG
1 SPRAY, SUSPENSION (ML) NASAL DAILY
Qty: 16 G | Refills: 5 | Status: SHIPPED | OUTPATIENT
Start: 2023-05-17 | End: 2023-06-26 | Stop reason: SDUPTHER

## 2023-05-17 ASSESSMENT — ENCOUNTER SYMPTOMS
BLOOD IN STOOL: 0
DIARRHEA: 0
TROUBLE SWALLOWING: 0
FEVER: 0
HEADACHES: 0
COUGH: 0
UNEXPECTED WEIGHT CHANGE: 0
PALPITATIONS: 0
ARTHRALGIAS: 0
HEMATURIA: 0
CONSTIPATION: 0
WHEEZING: 0
CHEST TIGHTNESS: 0
CHILLS: 0
VOMITING: 0
JOINT SWELLING: 0
SHORTNESS OF BREATH: 0
NERVOUS/ANXIOUS: 0
EYE REDNESS: 0
SORE THROAT: 0
DIZZINESS: 0
ABDOMINAL PAIN: 0
NAUSEA: 0
COLOR CHANGE: 0
ADENOPATHY: 0
DIFFICULTY URINATING: 0

## 2023-05-17 NOTE — PATIENT INSTRUCTIONS
Basic Instructions: You are recommended to complete all ordered testing as instructed by your provider. It is important that any prescribed medications be taken as directed. Keep all recommended appointments. Any orders placed at this visit are described within this After Visit Summary (AVS). Additional information regarding your care can be found within the chart notes accessible through [employer].    Contact the office with any healthcare concerns. If you are experiencing a medical emergency proceed to the nearest emergency department or dial 911 for medical assistance.    --------------------------

## 2023-05-17 NOTE — PROGRESS NOTES
"     [facility]     Patient Name: Robert Palacio  Age: 38 y.o.  Gender: female    Presents to the office for annual physical.     ASSESSMENT AND PLAN   Assessment/Plan   Problem List Items Addressed This Visit        Genitourinary    Genital herpes simplex       Ears/Nose/Throat    Allergic rhinitis       Mental Health    Current moderate episode of major depressive disorder without prior episode (CMS/HCC)    Relevant Orders    CBC and Differential    Comprehensive metabolic panel    Lipid panel       Other    Routine medical exam - Primary    Relevant Orders    CBC and Differential    Comprehensive metabolic panel    Lipid panel       WELL VISIT TREATMENT PLAN NOTES  Patient currently up to date with preventative screening recommendations  Labs ordered - see relevant order section  -------------------------------------------  NEXT VISIT CONSIDERATIONS  None at this time    Wellness Recommendations:    Exercise: engaging in regular physical activity is extremely important to preserve your functional capacity throughout your lifetime. Successful exercise is dependent upon 3 elements. Think \"DIF\". \"D\" = duration of exercise - 20 to 30 minutes per session. \"I\" = intensity - moderate intensity is preferred. You shouldn't be out of breath, nor should you be able to speak normally. Slightly winded is how I would put it. \"F\" = frequency - at least 3-5 times per week. Once a week won't cut it. Start our slow and gradually increase your activity to achieve the universal goal of 150 minutes of aerobic activity per week and 2 sessions of resistance exercise weekly would be ideal.     Eating: Importance of consuming a healthy diet should be emphasized in your health plan. Adoption of high quality, low fat, low carb foods was emphasized as was the importance of portion control. Take a look at the Mediterranean Diet - proven year after year as the healthiest diet.    Substance Abuse: refrain from tobacco usage " and vaping products as these can result in significant injury. If you choose to consume alcohol keep consumption within moderation. This is defined as recommendations 1 alcoholic drink per day. One drink is equal to 12 oz 4% beer, 6 oz wine or 1.5 oz spirits). The use of illicit drugs should never be considered. Use extreme caution when being prescribed opioid containing medications as these can easily become addictive and lead to illicit drug use.    Complete all of the recommended health maintenance measures we discussed during today's visit.     Schedule regular dental examinations and don't forget to brush and floss your teeth daily.    Enhance your safety by wearing seat belts, checking your smoke and CO detectors and monitoring living space for potential fall risks.     Practice safe sex where applicable.      Allergies Assessment:  Keeping allergy symptoms under good control can be accomplished by limiting exposure to environments or conditions that aggravate your allergy symptoms.     TREATMENT PLAN NOTES  Allergies stable with current treatment plan  -------------------------------------------  NEXT VISIT CONSIDERATIONS  None at this time      Major Depression Assessment: You have been diagnosed with depression. Avoid lifestyle activities that can worsen depression such as alcohol use beyond moderate levels or any illicit drug usage. Continue to participate in therapy/counseling through your health insurance plan, local treatment center, albert-based organization or employer based program. Take all prescribed medications as directed. Report any side effects if they develop. Return to the office with any lack of improvement, worsening of the depression, or as directed for routine follow up.     If you have thoughts of suicide or harming yourself or others, immediately call your local emergency number (such as 911) or go to the hospital emergency room. You may also call a suicide hotline from anywhere in the  "[place], 24 hours a day, 7 days a week: 0-454-EPTKIXU or 1-920-589-4820.    TREATMENT PLAN NOTES  Depression stable with current treatment plan  Currently participating in behavioral health  Specialists primarily managing condition  -------------------------------------------  NEXT VISIT CONSIDERATIONS  None at this time              RELEVANT VISIT DATA   BP Readings from Last 3 Encounters:   05/17/23 104/70   10/27/22 124/76   05/15/22 123/60     Wt Readings from Last 3 Encounters:   05/17/23 66.8 kg (147 lb 3.2 oz)   10/27/22 67 kg (147 lb 9.6 oz)   06/22/22 72.6 kg (160 lb)         VITALS   Vitals:    05/17/23 1143   BP: 104/70   Pulse: 84   Temp: 36.3 °C (97.3 °F)   SpO2: 98%   Weight: 66.8 kg (147 lb 3.2 oz)   Height: 1.638 m (5' 4.5\")     Body mass index is 24.88 kg/m².     ADDITIONAL INFORMATION   Patient denies any acute complaint that will require an additional workup at this time.    HPI     PAST MEDICAL AND SURGICAL HISTORY   No past medical history on file.    Past Surgical History:   Procedure Laterality Date   • NO PAST SURGERIES          MEDICATIONS     Current Outpatient Medications:   •  fluticasone propionate (FLONASE) 50 mcg/actuation nasal spray, Administer 1 spray into each nostril daily., Disp: 16 g, Rfl: 5  •  drospirenone-ethinyl estradiol (MAUDE,OCELLA) 3-0.03 mg per tablet, Take 1 tablet by mouth once daily., Disp: , Rfl: 1  •  sertraline (ZOLOFT) 50 mg tablet, Take 1 tablet (50 mg total) by mouth daily., Disp: 90 tablet, Rfl: 3     ALLERGIES   Penicillins     FAMILY HISTORY   Family History   Problem Relation Age of Onset   • Adrenal disorder Biological Father    • Diabetes Biological Father    • Hypertension Biological Father    • Diabetes Biological Mother    • Hypertension Biological Mother         SOCIAL HISTORY   Social History     Socioeconomic History   • Marital status: Single   Occupational History   • Occupation: Teacher    Tobacco Use   • Smoking status: Never   • " Smokeless tobacco: Never   Substance and Sexual Activity   • Alcohol use: Yes     Comment: social    • Drug use: No   • Sexual activity: Yes     Partners: Male     Birth control/protection: None     Social Determinants of Health     Food Insecurity: No Food Insecurity (5/15/2022)    Hunger Vital Sign    • Worried About Running Out of Food in the Last Year: Never true    • Ran Out of Food in the Last Year: Never true        REVIEW OF SYSTEMS   Review of Systems   Constitutional: Negative for chills, fever and unexpected weight change.   HENT: Negative for congestion, ear pain, sore throat and trouble swallowing.    Eyes: Negative for redness and visual disturbance.   Respiratory: Negative for cough, chest tightness, shortness of breath and wheezing.    Cardiovascular: Negative for chest pain, palpitations and leg swelling.   Gastrointestinal: Negative for abdominal pain, blood in stool, constipation, diarrhea, nausea and vomiting.   Genitourinary: Negative for difficulty urinating and hematuria.   Musculoskeletal: Negative for arthralgias and joint swelling.   Skin: Negative for color change and rash.   Neurological: Negative for dizziness and headaches.   Hematological: Negative for adenopathy.   Psychiatric/Behavioral: The patient is not nervous/anxious.         PHYSICAL EXAMINATION   Physical Exam  Constitutional:       Appearance: She is well-developed.   HENT:      Head: Normocephalic.      Right Ear: Tympanic membrane normal.      Left Ear: Tympanic membrane normal.      Nose: Nose normal. No rhinorrhea.      Mouth/Throat:      Pharynx: Uvula midline.   Eyes:      Conjunctiva/sclera: Conjunctivae normal.      Pupils: Pupils are equal, round, and reactive to light.   Neck:      Thyroid: No thyromegaly.   Cardiovascular:      Rate and Rhythm: Normal rate and regular rhythm.      Heart sounds: Normal heart sounds.   Pulmonary:      Effort: Pulmonary effort is normal.      Breath sounds: Normal breath sounds.    Musculoskeletal:         General: Normal range of motion.      Cervical back: Neck supple.   Lymphadenopathy:      Cervical: No cervical adenopathy.   Skin:     General: Skin is warm and dry.      Findings: No rash.   Neurological:      Mental Status: She is alert and oriented to person, place, and time.   Psychiatric:         Mood and Affect: Mood is not depressed.         Speech: Speech normal.         Behavior: Behavior normal.         Thought Content: Thought content normal.          You are recommended to complete all ordered testing as instructed by your provider. It is important that any prescribed medications be taken as directed. Keep all recommended appointments. Any orders placed this visit are listed below and in the After Visit Summary (AVS). Additional information may have been included in the Patient Instruction section of the AVS.    Contact the office with any healthcare concerns. If you are experiencing a medical emergency proceed to the nearest emergency department or dial 911 for medical assistance.     Antony Rocha,   05/17/23  12:41 PM

## 2023-06-26 RX ORDER — FLUTICASONE PROPIONATE 50 MCG
1 SPRAY, SUSPENSION (ML) NASAL DAILY
Qty: 24 G | Refills: 0 | Status: SHIPPED | OUTPATIENT
Start: 2023-06-26 | End: 2023-08-02 | Stop reason: SDUPTHER

## 2023-07-28 DIAGNOSIS — Z11.1 SCREENING FOR TUBERCULOSIS: Primary | ICD-10-CM

## 2023-07-31 ENCOUNTER — OFFICE VISIT (OUTPATIENT)
Dept: FAMILY MEDICINE | Facility: CLINIC | Age: 38
End: 2023-07-31
Payer: COMMERCIAL

## 2023-07-31 DIAGNOSIS — Z11.1 SCREENING FOR TUBERCULOSIS: Primary | ICD-10-CM

## 2023-07-31 PROCEDURE — 86580 TB INTRADERMAL TEST: CPT | Performed by: FAMILY MEDICINE

## 2023-07-31 PROCEDURE — 99999 PR OFFICE/OUTPT VISIT,PROCEDURE ONLY: CPT | Mod: 25 | Performed by: FAMILY MEDICINE

## 2023-08-02 ENCOUNTER — OFFICE VISIT (OUTPATIENT)
Dept: FAMILY MEDICINE | Facility: CLINIC | Age: 38
End: 2023-08-02
Payer: COMMERCIAL

## 2023-08-02 VITALS — HEIGHT: 64 IN | BODY MASS INDEX: 25.27 KG/M2 | HEART RATE: 72 BPM | TEMPERATURE: 97.5 F | OXYGEN SATURATION: 98 %

## 2023-08-02 DIAGNOSIS — N30.01 ACUTE CYSTITIS WITH HEMATURIA: ICD-10-CM

## 2023-08-02 DIAGNOSIS — J30.1 ALLERGIC RHINITIS DUE TO POLLEN, UNSPECIFIED SEASONALITY: Primary | ICD-10-CM

## 2023-08-02 LAB
INDURATION: 0 MM
TB SKIN TEST: NEGATIVE

## 2023-08-02 PROCEDURE — 3008F BODY MASS INDEX DOCD: CPT | Performed by: FAMILY MEDICINE

## 2023-08-02 PROCEDURE — 99213 OFFICE O/P EST LOW 20 MIN: CPT | Mod: GE | Performed by: FAMILY MEDICINE

## 2023-08-02 RX ORDER — SULFAMETHOXAZOLE AND TRIMETHOPRIM 800; 160 MG/1; MG/1
1 TABLET ORAL 2 TIMES DAILY
Qty: 6 TABLET | Refills: 0 | Status: SHIPPED | OUTPATIENT
Start: 2023-08-02 | End: 2023-08-05

## 2023-08-02 RX ORDER — FLUTICASONE PROPIONATE 50 MCG
1 SPRAY, SUSPENSION (ML) NASAL DAILY
Qty: 24 G | Refills: 3 | Status: SHIPPED | OUTPATIENT
Start: 2023-08-02 | End: 2024-06-18 | Stop reason: SDUPTHER

## 2023-08-02 ASSESSMENT — ENCOUNTER SYMPTOMS
FEVER: 0
DYSURIA: 1
FREQUENCY: 1

## 2023-08-02 NOTE — PROGRESS NOTES
[facility]     Chief Complaint  Robert Palacio is a 38 y.o. female who presents to the office for same day visit.    #physical exam in May - labs ordered     Patient presents with a 1 day history of UTI symptoms.    Dysuria and urgency  No smell  No fevers  Remote hx of UTI    Twin 11-month olds at home    Past Medical History:  Patient Active Problem List    Diagnosis Date Noted   • Acute cystitis with hematuria 08/02/2023   • Current moderate episode of major depressive disorder without prior episode (CMS/[facility]) 10/27/2022   • Routine medical exam 12/07/2020   • Genital herpes simplex 08/14/2014   • Allergic rhinitis 05/13/2011       Past Surgical History:  Past Surgical History:   Procedure Laterality Date   • NO PAST SURGERIES         Medications:    Current Outpatient Medications:   •  fluticasone propionate (FLONASE) 50 mcg/actuation nasal spray, Administer 1 spray into each nostril daily., Disp: 24 g, Rfl: 3  •  sulfamethoxazole-trimethoprim (BACTRIM DS) 800-160 mg per tablet, Take 1 tablet by mouth 2 (two) times a day for 3 days., Disp: 6 tablet, Rfl: 0  •  drospirenone-ethinyl estradiol (MAUDE,OCELLA) 3-0.03 mg per tablet, Take 1 tablet by mouth once daily., Disp: , Rfl: 1  •  sertraline (ZOLOFT) 50 mg tablet, Take 1 tablet (50 mg total) by mouth daily., Disp: 90 tablet, Rfl: 3    Allergies:  Penicillins    Family History:  Family History   Problem Relation Age of Onset   • Adrenal disorder Biological Father    • Diabetes Biological Father    • Hypertension Biological Father    • Diabetes Biological Mother    • Hypertension Biological Mother        Social History:  Social History     Socioeconomic History   • Marital status: Single     Spouse name: None   • Number of children: None   • Years of education: None   • Highest education level: None   Occupational History   • Occupation: Teacher    Tobacco Use   • Smoking status: Never   • Smokeless tobacco: Never   Substance and Sexual  "Activity   • Alcohol use: Yes     Comment: social    • Drug use: No   • Sexual activity: Yes     Partners: Male     Birth control/protection: None     Social Determinants of Health     Food Insecurity: No Food Insecurity (5/15/2022)    Hunger Vital Sign    • Worried About Running Out of Food in the Last Year: Never true    • Ran Out of Food in the Last Year: Never true       Review of Systems:  Review of Systems   Constitutional: Negative for fever.   Genitourinary: Positive for dysuria and frequency.   All other systems reviewed and are negative.    Physical Exam:  Visit Vitals  Pulse 72   Temp 36.4 °C (97.5 °F)   Ht 1.626 m (5' 4\")   SpO2 98%   BMI 25.27 kg/m²     Body mass index is 25.27 kg/m².    Physical Exam  Constitutional:       Appearance: Normal appearance. She is normal weight.   HENT:      Head: Normocephalic.      Mouth/Throat:      Mouth: Mucous membranes are moist.   Eyes:      Extraocular Movements: Extraocular movements intact.      Pupils: Pupils are equal, round, and reactive to light.   Pulmonary:      Effort: Pulmonary effort is normal. No respiratory distress.   Skin:     General: Skin is warm.      Capillary Refill: Capillary refill takes less than 2 seconds.   Neurological:      General: No focal deficit present.      Mental Status: She is alert and oriented to person, place, and time. Mental status is at baseline.   Psychiatric:         Mood and Affect: Mood normal.         Behavior: Behavior normal.       Labs/Imaging Studies:    Ordered at physical     Assessment and Plan:  Problem List Items Addressed This Visit        Genitourinary    Acute cystitis with hematuria     Acute cystitis diagnosed based on symptom and UA results.  Bactrim 160/800 bid for 3 days    UTI: encourage liquids, avoid holding urine. Take prescribed medications detailed in orders section as directed. RTO if no improvement.                   Ears/Nose/Throat    Allergic rhinitis - Primary     Refill flonase      "       Return to office prn      Dorcas Brady MD  08/02/23  5:17 PM

## 2023-08-02 NOTE — ASSESSMENT & PLAN NOTE
Acute cystitis diagnosed based on symptom and UA results.  Bactrim 160/800 bid for 3 days    UTI: encourage liquids, avoid holding urine. Take prescribed medications detailed in orders section as directed. RTO if no improvement.

## 2023-10-03 VITALS — HEIGHT: 64 IN | WEIGHT: 120 LBS | BODY MASS INDEX: 20.49 KG/M2

## 2023-10-03 NOTE — PROGRESS NOTES
Verification of Patient Location:  The patient affirms they are currently located in the following state: [place]    Request for Consent:    Audio and Video Encounter   Hello, my name is Antony Rocha DO.  Before we proceed, can you please verify your identification by telling me your full name and date of birth?  Can you tell me who is in the room with you?    You and I are about to have a telemedicine check-in or visit because you have requested it.  This is a live video-conference.  I am a real person, speaking to you in real time.  There is no one else with me on the video-conference. I am not recording this conversation and I am asking you not to record it.  This telemedicine visit will be billed to your health insurance or you, if you are self-insured.  You understand you will be responsible for any copayments or coinsurances that apply to your telemedicine visit.  Communication platform used for this encounter:  [employer] Video Visit (Epic Video Client)       Before starting our telemedicine visit, I am required to get your consent for this virtual check-in or visit by telemedicine. Do you consent?      Patient Response to Request for Consent:  Yes      Visit Documentation:  Subjective     Patient ID: Robert Palacio is a 38 y.o. female.  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     Recent Relevant CBC Components (if available):  Lab Results       Component                Value               Date                        HGB                      12.2                07/29/2021                 WBC                      9.7                 07/29/2021               Imaging Studies (if available):  No results found for this or any previous visit.        The following have been reviewed and updated as appropriate in this visit:   Tobacco  Allergies  Meds  Problems       Review of Systems   Constitutional: Negative for chills, fatigue and fever.   HENT: Positive for congestion and rhinorrhea. Negative for ear pain, nosebleeds, postnasal drip, sinus pressure, sinus pain and sore throat.    Respiratory: Positive for cough (non-productive). Negative for shortness of breath and wheezing.    Cardiovascular: Negative for chest pain.   Gastrointestinal: Negative for abdominal pain, diarrhea and nausea.   Musculoskeletal: Negative for myalgias.   Skin: Negative for rash.   Neurological: Negative for dizziness, light-headedness and headaches.         Assessment/Plan   Diagnoses and all orders for this visit:    Upper respiratory tract infection, unspecified type (Primary)    Other orders  -     benzonatate (TESSALON) 200 mg capsule; Take 1 capsule (200 mg total) by mouth 3 (three) times a day as needed for cough for up to 14 days.    Viral Upper Respiratory Infection: You have been diagnosed with an upper respiratory infection - also known as the common cold. It is likely you have NOT been prescribed antibiotics, as these are ineffective against viruses.    It is important for you to get adequate rest, stay well hydrated by drinking plenty of fluids, and take any prescribed or over the counter medications as directed. Over the counter cough drops, throat sprays and salt water gargles can help to soothe any throat soreness. Saline nasal sprays can help to reduce any nasal congestion.    It is not uncommon for cold symptoms to last for several days. Sometimes the cough can linger for several weeks. Contact the office with any questions or  concerns of a worsening condition.     Time Spent:  I spent 14 minutes on this date of service performing the following activities: obtaining history.

## 2023-10-04 ENCOUNTER — TELEMEDICINE (OUTPATIENT)
Dept: FAMILY MEDICINE | Facility: CLINIC | Age: 38
End: 2023-10-04
Payer: COMMERCIAL

## 2023-10-04 DIAGNOSIS — J06.9 UPPER RESPIRATORY TRACT INFECTION, UNSPECIFIED TYPE: Primary | ICD-10-CM

## 2023-10-04 PROCEDURE — 3008F BODY MASS INDEX DOCD: CPT | Performed by: FAMILY MEDICINE

## 2023-10-04 PROCEDURE — 99213 OFFICE O/P EST LOW 20 MIN: CPT | Mod: 95 | Performed by: FAMILY MEDICINE

## 2023-10-04 RX ORDER — BENZONATATE 200 MG/1
200 CAPSULE ORAL 3 TIMES DAILY PRN
Qty: 30 CAPSULE | Refills: 0 | Status: SHIPPED | OUTPATIENT
Start: 2023-10-04 | End: 2023-10-18

## 2023-10-04 ASSESSMENT — ENCOUNTER SYMPTOMS
SHORTNESS OF BREATH: 0
DIZZINESS: 0
ABDOMINAL PAIN: 0
WHEEZING: 0
HEADACHES: 0
DIARRHEA: 0
CHILLS: 0
COUGH: 1
SINUS PAIN: 0
SINUS PRESSURE: 0
SORE THROAT: 0
MYALGIAS: 0
FATIGUE: 0
FEVER: 0
RHINORRHEA: 1
NAUSEA: 0
LIGHT-HEADEDNESS: 0

## 2024-01-15 ENCOUNTER — APPOINTMENT (RX ONLY)
Dept: URBAN - METROPOLITAN AREA CLINIC 28 | Facility: CLINIC | Age: 39
Setting detail: DERMATOLOGY
End: 2024-01-15

## 2024-01-15 DIAGNOSIS — L98.0 PYOGENIC GRANULOMA: ICD-10-CM

## 2024-01-15 DIAGNOSIS — L70.0 ACNE VULGARIS: ICD-10-CM | Status: INADEQUATELY CONTROLLED

## 2024-01-15 PROBLEM — D48.5 NEOPLASM OF UNCERTAIN BEHAVIOR OF SKIN: Status: ACTIVE | Noted: 2024-01-15

## 2024-01-15 PROCEDURE — ? PRESCRIPTION MEDICATION MANAGEMENT

## 2024-01-15 PROCEDURE — ? COUNSELING

## 2024-01-15 PROCEDURE — ? PRESCRIPTION

## 2024-01-15 PROCEDURE — 99214 OFFICE O/P EST MOD 30 MIN: CPT | Mod: 25

## 2024-01-15 PROCEDURE — 11311 SHAVE SKIN LESION 0.6-1.0 CM: CPT

## 2024-01-15 PROCEDURE — ? SHAVE REMOVAL

## 2024-01-15 RX ORDER — SPIRONOLACTONE 100 MG/1
TABLET, FILM COATED ORAL QHS
Qty: 30 | Refills: 3 | Status: ERX | COMMUNITY
Start: 2024-01-15

## 2024-01-15 RX ORDER — BENZOYL PEROXIDE 100 MG/G
LOTION TOPICAL QDAY
Qty: 237 | Refills: 6 | Status: ERX

## 2024-01-15 RX ORDER — CLINDAMYCIN PHOSPHATE 10 MG/ML
SOLUTION TOPICAL QAM
Qty: 60 | Refills: 5 | Status: ERX

## 2024-01-15 RX ADMIN — SPIRONOLACTONE: 100 TABLET, FILM COATED ORAL at 00:00

## 2024-01-15 ASSESSMENT — LOCATION DETAILED DESCRIPTION DERM
LOCATION DETAILED: RIGHT SUPERIOR HELIX
LOCATION DETAILED: LEFT CENTRAL MALAR CHEEK

## 2024-01-15 ASSESSMENT — LOCATION SIMPLE DESCRIPTION DERM
LOCATION SIMPLE: RIGHT EAR
LOCATION SIMPLE: LEFT CHEEK

## 2024-01-15 ASSESSMENT — LOCATION ZONE DERM
LOCATION ZONE: FACE
LOCATION ZONE: EAR

## 2024-01-15 NOTE — PROCEDURE: COUNSELING
Doxycycline Pregnancy And Lactation Text: This medication is Pregnancy Category D and not consider safe during pregnancy. It is also excreted in breast milk but is considered safe for shorter treatment courses.
Azithromycin Counseling:  I discussed with the patient the risks of azithromycin including but not limited to GI upset, allergic reaction, drug rash, diarrhea, and yeast infections.
Tetracycline Pregnancy And Lactation Text: This medication is Pregnancy Category D and not consider safe during pregnancy. It is also excreted in breast milk.
Tetracycline Counseling: Patient counseled regarding possible photosensitivity and increased risk for sunburn.  Patient instructed to avoid sunlight, if possible.  When exposed to sunlight, patients should wear protective clothing, sunglasses, and sunscreen.  The patient was instructed to call the office immediately if the following severe adverse effects occur:  hearing changes, easy bruising/bleeding, severe headache, or vision changes.  The patient verbalized understanding of the proper use and possible adverse effects of tetracycline.  All of the patient's questions and concerns were addressed. Patient understands to avoid pregnancy while on therapy due to potential birth defects.
Include Pregnancy/Lactation Warning?: No
Isotretinoin Pregnancy And Lactation Text: This medication is Pregnancy Category X and is considered extremely dangerous during pregnancy. It is unknown if it is excreted in breast milk.
Topical Retinoid Pregnancy And Lactation Text: This medication is Pregnancy Category C. It is unknown if this medication is excreted in breast milk.
Sarecycline Counseling: Patient advised regarding possible photosensitivity and discoloration of the teeth, skin, lips, tongue and gums.  Patient instructed to avoid sunlight, if possible.  When exposed to sunlight, patients should wear protective clothing, sunglasses, and sunscreen.  The patient was instructed to call the office immediately if the following severe adverse effects occur:  hearing changes, easy bruising/bleeding, severe headache, or vision changes.  The patient verbalized understanding of the proper use and possible adverse effects of sarecycline.  All of the patient's questions and concerns were addressed.
Topical Sulfur Applications Counseling: Topical Sulfur Counseling: Patient counseled that this medication may cause skin irritation or allergic reactions.  In the event of skin irritation, the patient was advised to reduce the amount of the drug applied or use it less frequently.   The patient verbalized understanding of the proper use and possible adverse effects of topical sulfur application.  All of the patient's questions and concerns were addressed.
Azithromycin Pregnancy And Lactation Text: This medication is considered safe during pregnancy and is also secreted in breast milk.
Tazorac Counseling:  Patient advised that medication is irritating and drying.  Patient may need to apply sparingly and wash off after an hour before eventually leaving it on overnight.  The patient verbalized understanding of the proper use and possible adverse effects of tazorac.  All of the patient's questions and concerns were addressed.
Benzoyl Peroxide Counseling: Patient counseled that medicine may cause skin irritation and bleach clothing.  In the event of skin irritation, the patient was advised to reduce the amount of the drug applied or use it less frequently.   The patient verbalized understanding of the proper use and possible adverse effects of benzoyl peroxide.  All of the patient's questions and concerns were addressed.
High Dose Vitamin A Pregnancy And Lactation Text: High dose vitamin A therapy is contraindicated during pregnancy and breast feeding.
High Dose Vitamin A Counseling: Side effects reviewed, pt to contact office should one occur.
Birth Control Pills Pregnancy And Lactation Text: This medication should be avoided if pregnant and for the first 30 days post-partum.
Topical Sulfur Applications Pregnancy And Lactation Text: This medication is Pregnancy Category C and has an unknown safety profile during pregnancy. It is unknown if this topical medication is excreted in breast milk.
Erythromycin Counseling:  I discussed with the patient the risks of erythromycin including but not limited to GI upset, allergic reaction, drug rash, diarrhea, increase in liver enzymes, and yeast infections.
Erythromycin Pregnancy And Lactation Text: This medication is Pregnancy Category B and is considered safe during pregnancy. It is also excreted in breast milk.
Tazorac Pregnancy And Lactation Text: This medication is not safe during pregnancy. It is unknown if this medication is excreted in breast milk.
Minocycline Counseling: Patient advised regarding possible photosensitivity and discoloration of the teeth, skin, lips, tongue and gums.  Patient instructed to avoid sunlight, if possible.  When exposed to sunlight, patients should wear protective clothing, sunglasses, and sunscreen.  The patient was instructed to call the office immediately if the following severe adverse effects occur:  hearing changes, easy bruising/bleeding, severe headache, or vision changes.  The patient verbalized understanding of the proper use and possible adverse effects of minocycline.  All of the patient's questions and concerns were addressed.
Dapsone Pregnancy And Lactation Text: This medication is Pregnancy Category C and is not considered safe during pregnancy or breast feeding.
Dapsone Counseling: I discussed with the patient the risks of dapsone including but not limited to hemolytic anemia, agranulocytosis, rashes, methemoglobinemia, kidney failure, peripheral neuropathy, headaches, GI upset, and liver toxicity.  Patients who start dapsone require monitoring including baseline LFTs and weekly CBCs for the first month, then every month thereafter.  The patient verbalized understanding of the proper use and possible adverse effects of dapsone.  All of the patient's questions and concerns were addressed.
Benzoyl Peroxide Pregnancy And Lactation Text: This medication is Pregnancy Category C. It is unknown if benzoyl peroxide is excreted in breast milk.
Bactrim Counseling:  I discussed with the patient the risks of sulfa antibiotics including but not limited to GI upset, allergic reaction, drug rash, diarrhea, dizziness, photosensitivity, and yeast infections.  Rarely, more serious reactions can occur including but not limited to aplastic anemia, agranulocytosis, methemoglobinemia, blood dyscrasias, liver or kidney failure, lung infiltrates or desquamative/blistering drug rashes.
Spironolactone Counseling: Patient advised regarding risks of diarrhea, abdominal pain, hyperkalemia, birth defects (for female patients), liver toxicity and renal toxicity. The patient may need blood work to monitor liver and kidney function and potassium levels while on therapy. The patient verbalized understanding of the proper use and possible adverse effects of spironolactone.  All of the patient's questions and concerns were addressed.
Detail Level: Zone
Doxycycline Counseling:  Patient counseled regarding possible photosensitivity and increased risk for sunburn.  Patient instructed to avoid sunlight, if possible.  When exposed to sunlight, patients should wear protective clothing, sunglasses, and sunscreen.  The patient was instructed to call the office immediately if the following severe adverse effects occur:  hearing changes, easy bruising/bleeding, severe headache, or vision changes.  The patient verbalized understanding of the proper use and possible adverse effects of doxycycline.  All of the patient's questions and concerns were addressed.
Topical Clindamycin Counseling: Patient counseled that this medication may cause skin irritation or allergic reactions.  In the event of skin irritation, the patient was advised to reduce the amount of the drug applied or use it less frequently.   The patient verbalized understanding of the proper use and possible adverse effects of clindamycin.  All of the patient's questions and concerns were addressed.
Birth Control Pills Counseling: Birth Control Pill Counseling: I discussed with the patient the potential side effects of OCPs including but not limited to increased risk of stroke, heart attack, thrombophlebitis, deep venous thrombosis, hepatic adenomas, breast changes, GI upset, headaches, and depression.  The patient verbalized understanding of the proper use and possible adverse effects of OCPs. All of the patient's questions and concerns were addressed.
Topical Retinoid counseling:  Patient advised to apply a pea-sized amount only at bedtime and wait 30 minutes after washing their face before applying.  If too drying, patient may add a non-comedogenic moisturizer. The patient verbalized understanding of the proper use and possible adverse effects of retinoids.  All of the patient's questions and concerns were addressed.
Bactrim Pregnancy And Lactation Text: This medication is Pregnancy Category D and is known to cause fetal risk.  It is also excreted in breast milk.
Spironolactone Pregnancy And Lactation Text: This medication can cause feminization of the male fetus and should be avoided during pregnancy. The active metabolite is also found in breast milk.
Topical Clindamycin Pregnancy And Lactation Text: This medication is Pregnancy Category B and is considered safe during pregnancy. It is unknown if it is excreted in breast milk.
Isotretinoin Counseling: Patient should get monthly blood tests, not donate blood, not drive at night if vision affected, not share medication, and not undergo elective surgery for 6 months after tx completed. Side effects reviewed, pt to contact office should one occur.
Azelaic Acid Counseling: Patient counseled that medicine may cause skin irritation and to avoid applying near the eyes.  In the event of skin irritation, the patient was advised to reduce the amount of the drug applied or use it less frequently.   The patient verbalized understanding of the proper use and possible adverse effects of azelaic acid.  All of the patient's questions and concerns were addressed.
Winlevi Counseling:  I discussed with the patient the risks of topical clascoterone including but not limited to erythema, scaling, itching, and stinging. Patient voiced their understanding.
Azelaic Acid Pregnancy And Lactation Text: This medication is considered safe during pregnancy and breast feeding.
Winlevi Pregnancy And Lactation Text: This medication is considered safe during pregnancy and breastfeeding.
Aklief counseling:  Patient advised to apply a pea-sized amount only at bedtime and wait 30 minutes after washing their face before applying.  If too drying, patient may add a non-comedogenic moisturizer.  The most commonly reported side effects including irritation, redness, scaling, dryness, stinging, burning, itching, and increased risk of sunburn.  The patient verbalized understanding of the proper use and possible adverse effects of retinoids.  All of the patient's questions and concerns were addressed.
Aklief Pregnancy And Lactation Text: It is unknown if this medication is safe to use during pregnancy.  It is unknown if this medication is excreted in breast milk.  Breastfeeding women should use the topical cream on the smallest area of the skin for the shortest time needed while breastfeeding.  Do not apply to nipple and areola.
Detail Level: Detailed

## 2024-01-15 NOTE — PROCEDURE: PRESCRIPTION MEDICATION MANAGEMENT
Continue Regimen: benzoyl peroxide 10% topical cleanser: Wash face and chest Qam in the shower, let sit for 3-5 minutes then rinse off\\nclindamycin phosphate 1% topical swab: Wipe QAM to the acne of the face and body after using BPO Wash\\nSulfaCleanse 8-4 8%-4% topical suspension: Wash face QHS\\nFinacea 15% topical foam: Apply a thin layer to face QHS after washing face
Initiate Treatment: spironolactone 100 mg tablet QHS Take one tab po QHS
Detail Level: Zone
Render In Strict Bullet Format?: No

## 2024-01-25 ENCOUNTER — RX ONLY (OUTPATIENT)
Age: 39
Setting detail: RX ONLY
End: 2024-01-25

## 2024-01-25 RX ORDER — SODIUM SULFACETAMIDE AND SULFUR 80; 40 MG/ML; MG/ML
LOTION TOPICAL QHS
Qty: 473 | Refills: 5 | Status: ERX

## 2024-01-25 RX ORDER — AZELAIC ACID 0.15 G/G
AEROSOL, FOAM TOPICAL QHS
Qty: 50 | Refills: 3 | Status: ERX | COMMUNITY
Start: 2024-01-25

## 2024-01-25 RX ORDER — SPIRONOLACTONE 100 MG/1
TABLET, FILM COATED ORAL QHS
Qty: 30 | Refills: 3 | Status: ERX

## 2024-01-25 RX ORDER — BENZOYL PEROXIDE 100 MG/G
LOTION TOPICAL QDAY
Qty: 237 | Refills: 6 | Status: ERX

## 2024-01-25 RX ORDER — CLINDAMYCIN PHOSPHATE 10 MG/ML
1 SOLUTION TOPICAL QAM
Qty: 60 | Refills: 5 | Status: ERX

## 2024-02-14 ENCOUNTER — RX ONLY (OUTPATIENT)
Age: 39
Setting detail: RX ONLY
End: 2024-02-14

## 2024-02-14 RX ORDER — SPIRONOLACTONE 100 MG/1
TABLET, FILM COATED ORAL QHS
Qty: 90 | Refills: 3 | Status: ERX

## 2024-03-23 ENCOUNTER — OFFICE VISIT (OUTPATIENT)
Dept: FAMILY MEDICINE | Facility: CLINIC | Age: 39
End: 2024-03-23
Payer: COMMERCIAL

## 2024-03-23 VITALS
OXYGEN SATURATION: 97 % | SYSTOLIC BLOOD PRESSURE: 118 MMHG | HEIGHT: 65 IN | HEART RATE: 74 BPM | TEMPERATURE: 97.1 F | WEIGHT: 140 LBS | DIASTOLIC BLOOD PRESSURE: 66 MMHG | BODY MASS INDEX: 23.32 KG/M2

## 2024-03-23 DIAGNOSIS — J06.9 ACUTE URI: Primary | ICD-10-CM

## 2024-03-23 DIAGNOSIS — Z00.00 ROUTINE MEDICAL EXAM: ICD-10-CM

## 2024-03-23 PROBLEM — N30.01 ACUTE CYSTITIS WITH HEMATURIA: Status: RESOLVED | Noted: 2023-08-02 | Resolved: 2024-03-23

## 2024-03-23 PROCEDURE — 99214 OFFICE O/P EST MOD 30 MIN: CPT | Performed by: FAMILY MEDICINE

## 2024-03-23 PROCEDURE — 3008F BODY MASS INDEX DOCD: CPT | Performed by: FAMILY MEDICINE

## 2024-03-23 RX ORDER — BENZONATATE 200 MG/1
200 CAPSULE ORAL 3 TIMES DAILY PRN
Qty: 30 CAPSULE | Refills: 0 | Status: SHIPPED | OUTPATIENT
Start: 2024-03-23 | End: 2024-04-06

## 2024-03-23 ASSESSMENT — ENCOUNTER SYMPTOMS
ABDOMINAL PAIN: 0
NAUSEA: 0
DIARRHEA: 0
FATIGUE: 1
SHORTNESS OF BREATH: 0
HEADACHES: 0
WHEEZING: 0
LIGHT-HEADEDNESS: 0
SORE THROAT: 0
FEVER: 0
DIZZINESS: 0
SINUS PRESSURE: 0
RHINORRHEA: 1
COUGH: 1
MYALGIAS: 0
CHILLS: 0
SINUS PAIN: 0

## 2024-03-23 ASSESSMENT — PATIENT HEALTH QUESTIONNAIRE - PHQ9: SUM OF ALL RESPONSES TO PHQ9 QUESTIONS 1 & 2: 0

## 2024-03-23 NOTE — PROGRESS NOTES
"[facility] Medicine     Patient Name: Robert Palacio  Age: 38 y.o.  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      HPI     RELEVANT VISIT DATA        VITALS   Vitals:    03/23/24 0918   Pulse: 74   Temp: 36.2 °C (97.1 °F)   SpO2: 97%   Weight: 63.5 kg (140 lb)   Height: 1.651 m (5' 5\")     Body mass index is 23.3 kg/m².     PAST MEDICAL AND SURGICAL HISTORY   No past medical history on file.    Past Surgical History:   Procedure Laterality Date   • NO PAST SURGERIES          MEDICATIONS     Current Outpatient Medications:   •  drospirenone-ethinyl estradiol (MAUDE,OCELLA) 3-0.03 mg per tablet, Take 1 tablet by mouth once daily., Disp: , Rfl: 1  •  fluticasone propionate (FLONASE) 50 mcg/actuation nasal spray, Administer 1 spray into each nostril daily., Disp: 24 g, Rfl: 3  •  sertraline (ZOLOFT) 50 mg tablet, Take 1 tablet (50 mg total) by mouth daily., Disp: 90 tablet, Rfl: 3     ALLERGIES   Penicillins     FAMILY HISTORY   Family History   Problem Relation Age of Onset   • Adrenal disorder Biological Father    • Diabetes Biological Father    • Hypertension Biological Father    • Diabetes " Biological Mother    • Hypertension Biological Mother         SOCIAL HISTORY   Social History     Socioeconomic History   • Marital status: Single     Spouse name: None   • Number of children: None   • Years of education: None   • Highest education level: None   Occupational History   • Occupation: Teacher    Tobacco Use   • Smoking status: Never   • Smokeless tobacco: Never   Substance and Sexual Activity   • Alcohol use: Yes     Comment: social    • Drug use: No   • Sexual activity: Yes     Partners: Male     Birth control/protection: None     Social Determinants of Health     Food Insecurity: No Food Insecurity (5/15/2022)    Hunger Vital Sign    • Worried About Running Out of Food in the Last Year: Never true    • Ran Out of Food in the Last Year: Never true        REVIEW OF SYSTEMS   Review of Systems   Constitutional: Positive for fatigue. Negative for chills and fever.   HENT: Positive for congestion and rhinorrhea. Negative for ear pain, nosebleeds, postnasal drip, sinus pressure, sinus pain and sore throat.    Respiratory: Positive for cough (non-productive). Negative for shortness of breath and wheezing.    Cardiovascular: Negative for chest pain.   Gastrointestinal: Negative for abdominal pain, diarrhea and nausea.   Musculoskeletal: Negative for myalgias.   Skin: Negative for rash.   Neurological: Negative for dizziness, light-headedness and headaches.        PHYSICAL EXAMINATION   Physical Exam  Constitutional:       Appearance: She is well-developed.   HENT:      Head: Normocephalic.      Right Ear: Tympanic membrane normal.      Left Ear: Tympanic membrane normal.      Nose: Nose normal. No rhinorrhea.      Mouth/Throat:      Pharynx: Uvula midline.   Eyes:      Conjunctiva/sclera: Conjunctivae normal.      Pupils: Pupils are equal, round, and reactive to light.   Neck:      Thyroid: No thyromegaly.   Cardiovascular:      Rate and Rhythm: Normal rate and regular rhythm.      Heart sounds: Normal heart  sounds.   Pulmonary:      Effort: Pulmonary effort is normal.      Breath sounds: Normal breath sounds.   Musculoskeletal:         General: Normal range of motion.      Cervical back: Neck supple.   Lymphadenopathy:      Cervical: No cervical adenopathy.   Skin:     General: Skin is warm and dry.      Findings: No rash.   Neurological:      Mental Status: She is alert and oriented to person, place, and time.   Psychiatric:         Mood and Affect: Mood is not depressed.         Speech: Speech normal.         Behavior: Behavior normal.         Thought Content: Thought content normal.          ASSESSMENT AND PLAN   Assessment/Plan       Viral Upper Respiratory Infection: You have been diagnosed with an upper respiratory infection - also known as the common cold. It is likely you have NOT been prescribed antibiotics, as these are ineffective against viruses.    It is important for you to get adequate rest, stay well hydrated by drinking plenty of fluids, and take any prescribed or over the counter medications as directed. Over the counter cough drops, throat sprays and salt water gargles can help to soothe any throat soreness. Saline nasal sprays can help to reduce any nasal congestion.    It is not uncommon for cold symptoms to last for several days. Sometimes the cough can linger for several weeks. Contact the office with any questions or concerns of a worsening condition.         Problem List Items Addressed This Visit    None       You are recommended to complete all ordered testing as instructed by your provider. It is important that any prescribed medications be taken as directed. Keep all recommended appointments. Any orders placed this visit are listed below and in the After Visit Summary (AVS). Additional information may have been included in the Patient Instruction section of the AVS.    Contact the office with any healthcare concerns. If you are experiencing a medical emergency proceed to the nearest  emergency department or dial 911 for medical assistance.     Antony Rocha DO  03/23/24  9:22 AM

## 2024-06-16 LAB
ALBUMIN SERPL-MCNC: 4.3 G/DL (ref 3.9–4.9)
ALP SERPL-CCNC: 55 IU/L (ref 44–121)
ALT SERPL-CCNC: 14 IU/L (ref 0–32)
AST SERPL-CCNC: 26 IU/L (ref 0–40)
BASOPHILS # BLD AUTO: 0.1 X10E3/UL (ref 0–0.2)
BASOPHILS NFR BLD AUTO: 1 %
BILIRUB SERPL-MCNC: 0.6 MG/DL (ref 0–1.2)
BUN SERPL-MCNC: 11 MG/DL (ref 6–20)
BUN/CREAT SERPL: 14 (ref 9–23)
CALCIUM SERPL-MCNC: 9.6 MG/DL (ref 8.7–10.2)
CHLORIDE SERPL-SCNC: 108 MMOL/L (ref 96–106)
CHOLEST SERPL-MCNC: 151 MG/DL (ref 100–199)
CO2 SERPL-SCNC: 21 MMOL/L (ref 20–29)
CREAT SERPL-MCNC: 0.8 MG/DL (ref 0.57–1)
EGFRCR SERPLBLD CKD-EPI 2021: 96 ML/MIN/1.73
EOSINOPHIL # BLD AUTO: 0.2 X10E3/UL (ref 0–0.4)
EOSINOPHIL NFR BLD AUTO: 2 %
ERYTHROCYTE [DISTWIDTH] IN BLOOD BY AUTOMATED COUNT: 12.3 % (ref 11.7–15.4)
GLOBULIN SER CALC-MCNC: 3.1 G/DL (ref 1.5–4.5)
GLUCOSE SERPL-MCNC: 98 MG/DL (ref 70–99)
HCT VFR BLD AUTO: 37.4 % (ref 34–46.6)
HDLC SERPL-MCNC: 70 MG/DL
HGB BLD-MCNC: 12.4 G/DL (ref 11.1–15.9)
IMM GRANULOCYTES # BLD AUTO: 0 X10E3/UL (ref 0–0.1)
IMM GRANULOCYTES NFR BLD AUTO: 0 %
LDLC SERPL CALC-MCNC: 72 MG/DL (ref 0–99)
LYMPHOCYTES # BLD AUTO: 2.7 X10E3/UL (ref 0.7–3.1)
LYMPHOCYTES NFR BLD AUTO: 34 %
MCH RBC QN AUTO: 30 PG (ref 26.6–33)
MCHC RBC AUTO-ENTMCNC: 33.2 G/DL (ref 31.5–35.7)
MCV RBC AUTO: 90 FL (ref 79–97)
MONOCYTES # BLD AUTO: 0.5 X10E3/UL (ref 0.1–0.9)
MONOCYTES NFR BLD AUTO: 6 %
NEUTROPHILS # BLD AUTO: 4.6 X10E3/UL (ref 1.4–7)
NEUTROPHILS NFR BLD AUTO: 57 %
PLATELET # BLD AUTO: 316 X10E3/UL (ref 150–450)
POTASSIUM SERPL-SCNC: 4.1 MMOL/L (ref 3.5–5.2)
PROT SERPL-MCNC: 7.4 G/DL (ref 6–8.5)
RBC # BLD AUTO: 4.14 X10E6/UL (ref 3.77–5.28)
SODIUM SERPL-SCNC: 142 MMOL/L (ref 134–144)
TRIGL SERPL-MCNC: 37 MG/DL (ref 0–149)
VLDLC SERPL CALC-MCNC: 9 MG/DL (ref 5–40)
WBC # BLD AUTO: 7.9 X10E3/UL (ref 3.4–10.8)

## 2024-06-18 ENCOUNTER — OFFICE VISIT (OUTPATIENT)
Dept: FAMILY MEDICINE | Facility: CLINIC | Age: 39
End: 2024-06-18
Payer: COMMERCIAL

## 2024-06-18 VITALS
TEMPERATURE: 97.7 F | WEIGHT: 140.4 LBS | BODY MASS INDEX: 23.39 KG/M2 | SYSTOLIC BLOOD PRESSURE: 116 MMHG | DIASTOLIC BLOOD PRESSURE: 72 MMHG | HEIGHT: 65 IN

## 2024-06-18 DIAGNOSIS — Z00.00 ROUTINE MEDICAL EXAM: Primary | ICD-10-CM

## 2024-06-18 DIAGNOSIS — F32.1 CURRENT MODERATE EPISODE OF MAJOR DEPRESSIVE DISORDER WITHOUT PRIOR EPISODE (CMS/HCC): ICD-10-CM

## 2024-06-18 DIAGNOSIS — J30.1 ALLERGIC RHINITIS DUE TO POLLEN, UNSPECIFIED SEASONALITY: ICD-10-CM

## 2024-06-18 DIAGNOSIS — L30.9 DERMATITIS: ICD-10-CM

## 2024-06-18 PROCEDURE — 3008F BODY MASS INDEX DOCD: CPT | Performed by: FAMILY MEDICINE

## 2024-06-18 PROCEDURE — 99395 PREV VISIT EST AGE 18-39: CPT | Performed by: FAMILY MEDICINE

## 2024-06-18 RX ORDER — SCOPOLAMINE 1 MG/3D
1 PATCH, EXTENDED RELEASE TRANSDERMAL
Qty: 4 PATCH | Refills: 0 | Status: SHIPPED | OUTPATIENT
Start: 2024-06-18 | End: 2024-08-19 | Stop reason: SDUPTHER

## 2024-06-18 RX ORDER — FLUTICASONE PROPIONATE 50 MCG
1 SPRAY, SUSPENSION (ML) NASAL DAILY
Qty: 24 G | Refills: 3 | Status: SHIPPED | OUTPATIENT
Start: 2024-06-18

## 2024-06-18 ASSESSMENT — ENCOUNTER SYMPTOMS
JOINT SWELLING: 0
CHEST TIGHTNESS: 0
WHEEZING: 0
NAUSEA: 0
FEVER: 0
TROUBLE SWALLOWING: 0
PALPITATIONS: 0
HEADACHES: 0
DIZZINESS: 0
CHILLS: 0
ARTHRALGIAS: 0
ABDOMINAL PAIN: 0
UNEXPECTED WEIGHT CHANGE: 0
ADENOPATHY: 0
EYE REDNESS: 0
COUGH: 0
HEMATURIA: 0
SHORTNESS OF BREATH: 0
DIFFICULTY URINATING: 0
CONSTIPATION: 0
NERVOUS/ANXIOUS: 0
SORE THROAT: 0
BLOOD IN STOOL: 0
DIARRHEA: 0
COLOR CHANGE: 0
VOMITING: 0

## 2024-06-18 NOTE — PROGRESS NOTES
"     [facility]     Patient Name: Robret Palacio  Age: 39 y.o.  Gender: female    Presents to the office for annual physical.     ASSESSMENT AND PLAN   Problem List Items Addressed This Visit          Ears/Nose/Throat    Allergic rhinitis    Relevant Medications    fluticasone propionate (FLONASE) 50 mcg/actuation nasal spray       Mental Health    Current moderate episode of major depressive disorder without prior episode (CMS/HCC)       Other    Routine medical exam - Primary     Other Visit Diagnoses       Dermatitis        Relevant Orders    Ambulatory referral to Dermatology            WELL VISIT TREATMENT PLAN NOTES  Patient currently up to date with preventative screening recommendations  Reviewed recently completed test results with patient  -------------------------------------------  NEXT VISIT CONSIDERATIONS  None at this time    Wellness Recommendations:    Exercise: engaging in regular physical activity is extremely important to preserve your functional capacity throughout your lifetime. Successful exercise is dependent upon 3 elements. Think \"DIF\". \"D\" = duration of exercise - 20 to 30 minutes per session. \"I\" = intensity - moderate intensity is preferred. You shouldn't be out of breath, nor should you be able to speak normally. Slightly winded is how I would put it. \"F\" = frequency - at least 3-5 times per week. Once a week won't cut it. Start our slow and gradually increase your activity to achieve the universal goal of 150 minutes of aerobic activity per week and 2 sessions of resistance exercise weekly would be ideal.     Eating: Importance of consuming a healthy diet should be emphasized in your health plan. Adoption of high quality, low fat, low carb foods was emphasized as was the importance of portion control. Take a look at the Mediterranean Diet - proven year after year as the healthiest diet.    Substance Abuse: refrain from tobacco usage and vaping products as these can " result in significant injury. If you choose to consume alcohol keep consumption within moderation. This is defined as recommendations 1 alcoholic drink per day. One drink is equal to 12 oz 4% beer, 6 oz wine or 1.5 oz spirits). The use of illicit drugs should never be considered. Use extreme caution when being prescribed opioid containing medications as these can easily become addictive and lead to illicit drug use.    Complete all of the recommended health maintenance measures we discussed during today's visit.     Schedule regular dental examinations and don't forget to brush and floss your teeth daily.    Enhance your safety by wearing seat belts, checking your smoke and CO detectors and monitoring living space for potential fall risks.     Practice safe sex where applicable.    Allergies Assessment:  Keeping allergy symptoms under good control can be accomplished by limiting exposure to environments or conditions that aggravate your allergy symptoms.     TREATMENT PLAN NOTES  Allergies stable with current treatment plan  -------------------------------------------  NEXT VISIT CONSIDERATIONS  None at this time      Major Depression Assessment: You have been diagnosed with depression. Avoid lifestyle activities that can worsen depression such as alcohol use beyond moderate levels or any illicit drug usage. Continue to participate in therapy/counseling through your health insurance plan, local treatment center, albert-based organization or employer based program. Take all prescribed medications as directed. Report any side effects if they develop. Return to the office with any lack of improvement, worsening of the depression, or as directed for routine follow up.     If you have thoughts of suicide or harming yourself or others, immediately call your local emergency number (such as 911) or go to the hospital emergency room. You may also call a suicide hotline from anywhere in the [place], 24 hours a day, 7  "days a week: 5-962-JUFFQMB or 0-109-754-0163.    TREATMENT PLAN NOTES  Depression stable with current treatment plan  -------------------------------------------  NEXT VISIT CONSIDERATIONS  None at this time     Ear dermatitis - refer to dermatology     RELEVANT VISIT DATA   BP Readings from Last 3 Encounters:   06/18/24 116/72   03/23/24 118/66   05/17/23 104/70      Wt Readings from Last 3 Encounters:   06/18/24 63.7 kg (140 lb 6.4 oz)   03/23/24 63.5 kg (140 lb)   10/03/23 54.4 kg (120 lb)     Lab Results   Component Value Date    CHOL 151 06/15/2024    TRIG 37 06/15/2024    HDL 70 06/15/2024    LDLCALC 72 06/15/2024        VITALS   Vitals:    06/18/24 1602   BP: 116/72   Temp: 36.5 °C (97.7 °F)   Weight: 63.7 kg (140 lb 6.4 oz)   Height: 1.651 m (5' 5\")     Body mass index is 23.36 kg/m².     ADDITIONAL INFORMATION   Growth on R ear for past year. Has seen derm in past with no resolution.    HPI     PAST MEDICAL AND SURGICAL HISTORY   No past medical history on file.    Past Surgical History:   Procedure Laterality Date    NO PAST SURGERIES          MEDICATIONS     Current Outpatient Medications:     fluticasone propionate (FLONASE) 50 mcg/actuation nasal spray, Administer 1 spray into each nostril daily., Disp: 24 g, Rfl: 3    scopolamine (TRANSDERM-SCOP) 1 mg over 3 days transdermal patch, Place 1 patch on the skin every 3rd (third) day., Disp: 4 patch, Rfl: 0    drospirenone-ethinyl estradiol (MAUDE,OCELLA) 3-0.03 mg per tablet, Take 1 tablet by mouth once daily., Disp: , Rfl: 1    sertraline (ZOLOFT) 50 mg tablet, Take 1 tablet (50 mg total) by mouth daily., Disp: 90 tablet, Rfl: 3     ALLERGIES   Penicillins     FAMILY HISTORY   Family History   Problem Relation Age of Onset    Adrenal disorder Biological Father     Diabetes Biological Father     Hypertension Biological Father     Diabetes Biological Mother     Hypertension Biological Mother         SOCIAL HISTORY   Social History     Socioeconomic History "    Marital status: Single     Spouse name: None    Number of children: None    Years of education: None    Highest education level: None   Occupational History    Occupation: Teacher    Tobacco Use    Smoking status: Never    Smokeless tobacco: Never   Substance and Sexual Activity    Alcohol use: Yes     Comment: social     Drug use: No    Sexual activity: Yes     Partners: Male     Birth control/protection: None     Social Determinants of Health     Food Insecurity: No Food Insecurity (5/15/2022)    Hunger Vital Sign     Worried About Running Out of Food in the Last Year: Never true     Ran Out of Food in the Last Year: Never true        REVIEW OF SYSTEMS   Review of Systems   Constitutional:  Negative for chills, fever and unexpected weight change.   HENT:  Negative for congestion, ear pain, sore throat and trouble swallowing.    Eyes:  Negative for redness and visual disturbance.   Respiratory:  Negative for cough, chest tightness, shortness of breath and wheezing.    Cardiovascular:  Negative for chest pain, palpitations and leg swelling.   Gastrointestinal:  Negative for abdominal pain, blood in stool, constipation, diarrhea, nausea and vomiting.   Genitourinary:  Negative for difficulty urinating and hematuria.   Musculoskeletal:  Negative for arthralgias and joint swelling.   Skin:  Negative for color change and rash.   Neurological:  Negative for dizziness and headaches.   Hematological:  Negative for adenopathy.   Psychiatric/Behavioral:  The patient is not nervous/anxious.         PHYSICAL EXAMINATION   Physical Exam  Constitutional:       Appearance: She is well-developed.   HENT:      Head: Normocephalic.      Right Ear: Tympanic membrane normal.      Left Ear: Tympanic membrane normal.      Nose: Nose normal. No rhinorrhea.      Mouth/Throat:      Pharynx: Uvula midline.   Eyes:      Conjunctiva/sclera: Conjunctivae normal.      Pupils: Pupils are equal, round, and reactive to light.   Neck:       Thyroid: No thyromegaly.   Cardiovascular:      Rate and Rhythm: Normal rate and regular rhythm.      Heart sounds: Normal heart sounds.   Pulmonary:      Effort: Pulmonary effort is normal.      Breath sounds: Normal breath sounds.   Musculoskeletal:         General: Normal range of motion.      Cervical back: Neck supple.   Lymphadenopathy:      Cervical: No cervical adenopathy.   Skin:     General: Skin is warm and dry.      Findings: No rash.   Neurological:      Mental Status: She is alert and oriented to person, place, and time.   Psychiatric:         Mood and Affect: Mood is not depressed.         Speech: Speech normal.         Behavior: Behavior normal.         Thought Content: Thought content normal.          You are recommended to complete all ordered testing as instructed by your provider. It is important that any prescribed medications be taken as directed. Keep all recommended appointments. Any orders placed this visit are listed below and in the After Visit Summary (AVS). Additional information may have been included in the Patient Instruction section of the AVS.    Contact the office with any healthcare concerns. If you are experiencing a medical emergency proceed to the nearest emergency department or dial 911 for medical assistance.     Antony Rocha,   06/18/24  5:06 PM

## 2024-08-16 ENCOUNTER — TELEPHONE (OUTPATIENT)
Dept: FAMILY MEDICINE | Facility: CLINIC | Age: 39
End: 2024-08-16

## 2024-08-16 NOTE — TELEPHONE ENCOUNTER
Medication Request   Patient PCP: Antony Rocha, DO  Next Office Visit: Visit date not found  Has this provider prescribed this medication before?: Yes  Medication Name: scopolamine (TRANSDERM-SCOP) 1 mg over 3 days transdermal patch  Medication Dose:  1 mg over 3 days transdermal patch (4 patches)  Medication Frequency:   Preferred Pharmacy:   [facility] [id number] [place], [place] - [address]  [address] [facility] [address]  Phone: [phone] Fax: [phone]      Please allow 2 business days for your provider to send your medication request or to reach out to discuss.

## 2024-08-19 DIAGNOSIS — T75.3XXA MOTION SICKNESS, INITIAL ENCOUNTER: Primary | ICD-10-CM

## 2024-08-19 RX ORDER — SCOPOLAMINE 1 MG/3D
1 PATCH, EXTENDED RELEASE TRANSDERMAL
Qty: 4 PATCH | Refills: 0 | Status: SHIPPED | OUTPATIENT
Start: 2024-08-19 | End: 2024-09-18

## 2024-08-19 NOTE — TELEPHONE ENCOUNTER
Robert,  The order has been entered into Epic and my office staff will process the referral shortly. If you have any questions let us know. The staff will reach out to you for any additional information.  Thanks.  Dr. HUGO
